# Patient Record
Sex: MALE | NOT HISPANIC OR LATINO | Employment: FULL TIME | URBAN - METROPOLITAN AREA
[De-identification: names, ages, dates, MRNs, and addresses within clinical notes are randomized per-mention and may not be internally consistent; named-entity substitution may affect disease eponyms.]

---

## 2021-05-27 ENCOUNTER — OFFICE VISIT (OUTPATIENT)
Dept: GASTROENTEROLOGY | Facility: CLINIC | Age: 51
End: 2021-05-27
Payer: COMMERCIAL

## 2021-05-27 VITALS
SYSTOLIC BLOOD PRESSURE: 124 MMHG | BODY MASS INDEX: 31.15 KG/M2 | DIASTOLIC BLOOD PRESSURE: 83 MMHG | HEIGHT: 72 IN | HEART RATE: 74 BPM | WEIGHT: 230 LBS

## 2021-05-27 DIAGNOSIS — K20.0 EOSINOPHILIC ESOPHAGITIS: Primary | ICD-10-CM

## 2021-05-27 DIAGNOSIS — L40.50 PSORIATIC ARTHRITIS (HCC): ICD-10-CM

## 2021-05-27 DIAGNOSIS — Z12.11 ENCOUNTER FOR SCREENING COLONOSCOPY: ICD-10-CM

## 2021-05-27 DIAGNOSIS — R13.14 PHARYNGOESOPHAGEAL DYSPHAGIA: ICD-10-CM

## 2021-05-27 PROCEDURE — 99214 OFFICE O/P EST MOD 30 MIN: CPT | Performed by: INTERNAL MEDICINE

## 2021-05-27 RX ORDER — FAMOTIDINE 20 MG/1
TABLET, FILM COATED ORAL
COMMUNITY
End: 2021-07-13 | Stop reason: HOSPADM

## 2021-05-27 RX ORDER — DEXAMETHASONE 4 MG/1
TABLET ORAL
COMMUNITY
Start: 2021-05-13 | End: 2021-09-21 | Stop reason: SDUPTHER

## 2021-05-27 RX ORDER — ADALIMUMAB 40MG/0.4ML
KIT SUBCUTANEOUS
COMMUNITY
Start: 2021-05-20

## 2021-05-27 RX ORDER — MOMETASONE FUROATE 50 UG/1
SPRAY, METERED NASAL
COMMUNITY
Start: 2021-05-13 | End: 2021-07-12 | Stop reason: ALTCHOICE

## 2021-05-27 NOTE — PROGRESS NOTES
[facility] - Outpatient Follow-up Note  Tae Grimes 46 y o  male MRN: [id number]  Encounter: [id number]          ASSESSMENT AND PLAN:      1  Eosinophilic esophagitis   patient has history of eosinophilic esophagitis  Patient was referred for allergy testings  However no significant of definitive allergy was found  Patient tells me that he was positive for dust mite  Patient was put on Flovent inhaler which he is still using  He denies any nausea or vomiting  He denies any significant heartburn or indigestion  He continues to have difficulty in swallowing with occasional food getting stuck  This usually happens with bread and meat  He denies any weight loss or weight gain  He denies any other systemic symptoms at this time  He has done well overall     - EGD; Future  - PAT Covid Screening; Future    2  Pharyngoesophageal dysphagia    Patient is having some difficulty in swallowing secondary to eosinophilic esophagitis  Upper endoscopy and dilatation will be performed for further evaluation and recommendations     - EGD; Future  - PAT Covid Screening; Future    3  Psoriatic arthritis ([address] )    Patient does have history of psoriatic arthritis  He takes you   Humira injections per that  His cirrhotic rash and arthritis his well under control at this time  - PAT Covid Screening; Future    4  Encounter for screening colonoscopy  Patient has turned 48  A screening colonoscopy will be done  There is no significant family history of colon cancer in the family  His grandfather probably had stomach cancer  However it is unclear  - Colonoscopy; Future  - PAT Covid Screening; Future    ______________________________________________________________________    SUBJECTIVE:    Intermittent dysphagia  No weight loss or weight gain  No change in bowel habits or rectal bleeding  REVIEW OF SYSTEMS IS OTHERWISE NEGATIVE        Historical Information   Past Medical History: Diagnosis Date    Arthritis      Past Surgical History:   Procedure Laterality Date    HERNIA REPAIR      UPPER GASTROINTESTINAL ENDOSCOPY       Social History   Social History     Substance and Sexual Activity   Alcohol Use Not Currently     Social History     Substance and Sexual Activity   Drug Use Never     Social History     Tobacco Use   Smoking Status Never Smoker   Smokeless Tobacco Never Used     History reviewed  No pertinent family history  Meds/Allergies       Current Outpatient Medications:     famotidine (PEPCID) 20 mg tablet    Humira Pen 40 MG/0 4ML PNKT    mometasone (Nasonex) 50 mcg/act nasal spray    Flovent  MCG/ACT inhaler    No Known Allergies        Objective     Blood pressure 124/83, pulse 74, height 6' (1 829 m), weight 104 kg (230 lb)  Body mass index is 31 19 kg/m²  PHYSICAL EXAM:      General Appearance:   Alert, cooperative, no distress   HEENT:   Normocephalic, atraumatic, anicteric      Neck:  Supple, symmetrical, trachea midline   Lungs:   Clear to auscultation bilaterally; no rales, rhonchi or wheezing; respirations unlabored    Heart[de-identified]   Regular rate and rhythm; no murmur, rub, or gallop  Abdomen:   Soft, non-tender, non-distended; normal bowel sounds; no masses, no organomegaly    Genitalia:   Deferred    Rectal:   Deferred    Extremities:  No cyanosis, clubbing or edema    Pulses:  2+ and symmetric    Skin:  No jaundice, rashes, or lesions    Lymph nodes:  No palpable cervical lymphadenopathy        Lab Results:   No visits with results within 1 Day(s) from this visit  Latest known visit with results is:   No results found for any previous visit  Radiology Results:   No results found

## 2021-06-08 ENCOUNTER — TELEPHONE (OUTPATIENT)
Dept: GASTROENTEROLOGY | Facility: CLINIC | Age: 51
End: 2021-06-08

## 2021-06-08 NOTE — TELEPHONE ENCOUNTER
Pt called lmom that he needs to reschedule his procedure  I called back lmom for him to call back to get rescheduled  If he calls back please get him rescheduled  I did not cancel appt

## 2021-07-12 RX ORDER — FLUTICASONE PROPIONATE 50 MCG
SPRAY, SUSPENSION (ML) NASAL
COMMUNITY
Start: 2021-06-18

## 2021-07-13 ENCOUNTER — ANESTHESIA EVENT (OUTPATIENT)
Dept: GASTROENTEROLOGY | Facility: AMBULATORY SURGERY CENTER | Age: 51
End: 2021-07-13

## 2021-07-13 ENCOUNTER — ANESTHESIA (OUTPATIENT)
Dept: GASTROENTEROLOGY | Facility: AMBULATORY SURGERY CENTER | Age: 51
End: 2021-07-13

## 2021-07-13 ENCOUNTER — HOSPITAL ENCOUNTER (OUTPATIENT)
Dept: GASTROENTEROLOGY | Facility: AMBULATORY SURGERY CENTER | Age: 51
Discharge: HOME | End: 2021-07-13
Payer: COMMERCIAL

## 2021-07-13 VITALS
WEIGHT: 230 LBS | SYSTOLIC BLOOD PRESSURE: 120 MMHG | BODY MASS INDEX: 31.15 KG/M2 | OXYGEN SATURATION: 98 % | TEMPERATURE: 94.8 F | HEIGHT: 72 IN | RESPIRATION RATE: 18 BRPM | DIASTOLIC BLOOD PRESSURE: 83 MMHG | HEART RATE: 60 BPM

## 2021-07-13 DIAGNOSIS — Z12.11 ENCOUNTER FOR SCREENING COLONOSCOPY: ICD-10-CM

## 2021-07-13 DIAGNOSIS — R13.14 PHARYNGOESOPHAGEAL DYSPHAGIA: ICD-10-CM

## 2021-07-13 DIAGNOSIS — K21.00 GASTROESOPHAGEAL REFLUX DISEASE WITH ESOPHAGITIS WITHOUT HEMORRHAGE: Primary | ICD-10-CM

## 2021-07-13 DIAGNOSIS — K20.0 EOSINOPHILIC ESOPHAGITIS: ICD-10-CM

## 2021-07-13 PROCEDURE — 00813 ANES UPR LWR GI NDSC PX: CPT | Performed by: NURSE ANESTHETIST, CERTIFIED REGISTERED

## 2021-07-13 PROCEDURE — 88305 TISSUE EXAM BY PATHOLOGIST: CPT | Performed by: PATHOLOGY

## 2021-07-13 PROCEDURE — 45385 COLONOSCOPY W/LESION REMOVAL: CPT | Performed by: INTERNAL MEDICINE

## 2021-07-13 PROCEDURE — 43249 ESOPH EGD DILATION <30 MM: CPT | Performed by: INTERNAL MEDICINE

## 2021-07-13 PROCEDURE — 43239 EGD BIOPSY SINGLE/MULTIPLE: CPT | Performed by: INTERNAL MEDICINE

## 2021-07-13 PROCEDURE — 45380 COLONOSCOPY AND BIOPSY: CPT | Performed by: INTERNAL MEDICINE

## 2021-07-13 RX ORDER — LIDOCAINE HYDROCHLORIDE 20 MG/ML
INJECTION, SOLUTION EPIDURAL; INFILTRATION; INTRACAUDAL; PERINEURAL AS NEEDED
Status: DISCONTINUED | OUTPATIENT
Start: 2021-07-13 | End: 2021-07-13

## 2021-07-13 RX ORDER — SODIUM CHLORIDE 9 MG/ML
INJECTION, SOLUTION INTRAVENOUS CONTINUOUS PRN
Status: DISCONTINUED | OUTPATIENT
Start: 2021-07-13 | End: 2021-07-13

## 2021-07-13 RX ORDER — PANTOPRAZOLE SODIUM 40 MG/1
40 TABLET, DELAYED RELEASE ORAL DAILY
Qty: 90 TABLET | Refills: 3 | Status: SHIPPED | OUTPATIENT
Start: 2021-07-13 | End: 2022-05-26

## 2021-07-13 RX ORDER — PROPOFOL 10 MG/ML
INJECTION, EMULSION INTRAVENOUS AS NEEDED
Status: DISCONTINUED | OUTPATIENT
Start: 2021-07-13 | End: 2021-07-13

## 2021-07-13 RX ADMIN — PROPOFOL 50 MG: 10 INJECTION, EMULSION INTRAVENOUS at 09:23

## 2021-07-13 RX ADMIN — PROPOFOL 50 MG: 10 INJECTION, EMULSION INTRAVENOUS at 09:42

## 2021-07-13 RX ADMIN — PROPOFOL 50 MG: 10 INJECTION, EMULSION INTRAVENOUS at 09:48

## 2021-07-13 RX ADMIN — PROPOFOL 50 MG: 10 INJECTION, EMULSION INTRAVENOUS at 09:25

## 2021-07-13 RX ADMIN — SODIUM CHLORIDE: 9 INJECTION, SOLUTION INTRAVENOUS at 09:16

## 2021-07-13 RX ADMIN — PROPOFOL 50 MG: 10 INJECTION, EMULSION INTRAVENOUS at 09:31

## 2021-07-13 RX ADMIN — LIDOCAINE HYDROCHLORIDE 50 MG: 20 INJECTION, SOLUTION EPIDURAL; INFILTRATION; INTRACAUDAL; PERINEURAL at 09:21

## 2021-07-13 RX ADMIN — PROPOFOL 50 MG: 10 INJECTION, EMULSION INTRAVENOUS at 09:28

## 2021-07-13 RX ADMIN — PROPOFOL 50 MG: 10 INJECTION, EMULSION INTRAVENOUS at 09:24

## 2021-07-13 RX ADMIN — PROPOFOL 50 MG: 10 INJECTION, EMULSION INTRAVENOUS at 09:35

## 2021-07-13 RX ADMIN — PROPOFOL 150 MG: 10 INJECTION, EMULSION INTRAVENOUS at 09:21

## 2021-07-13 RX ADMIN — PROPOFOL 50 MG: 10 INJECTION, EMULSION INTRAVENOUS at 09:39

## 2021-07-13 RX ADMIN — LIDOCAINE HYDROCHLORIDE 50 MG: 20 INJECTION, SOLUTION EPIDURAL; INFILTRATION; INTRACAUDAL; PERINEURAL at 09:22

## 2021-07-13 RX ADMIN — PROPOFOL 50 MG: 10 INJECTION, EMULSION INTRAVENOUS at 09:44

## 2021-07-13 NOTE — ANESTHESIA POSTPROCEDURE EVALUATION
Post-Op Assessment Note    CV Status:  Stable  Pain Score: 0    Pain management: adequate     Mental Status:  Alert and awake   Hydration Status:  Stable   PONV Controlled:  None   Airway Patency:  Patent      Post Op Vitals Reviewed: Yes      Staff: CRNA         No complications documented      BP   100/62   Temp     Pulse  60   Resp   18   SpO2   95

## 2021-07-13 NOTE — H&P
History and Physical -  [facility]  Kavitha Monae 46 y o  male MRN: [id number]                  HPI: Kavitha Monae is a 46y o  year old male who presents for upper endoscopy and colonoscopy  Patient has history of eosinophilic esophagitis  Screening colonoscopy is being done  REVIEW OF SYSTEMS: Per the HPI, and otherwise unremarkable  Historical Information   Past Medical History:   Diagnosis Date    Arthritis     psoriatic    Dysphagia     hx of dilatation    Eosinophilic esophagitis     GERD (gastroesophageal reflux disease)      Past Surgical History:   Procedure Laterality Date    HERNIA REPAIR      right inguinal    UPPER GASTROINTESTINAL ENDOSCOPY       Social History   Social History     Substance and Sexual Activity   Alcohol Use Not Currently     Social History     Substance and Sexual Activity   Drug Use Never     Social History     Tobacco Use   Smoking Status Never Smoker   Smokeless Tobacco Never Used     History reviewed  No pertinent family history  Meds/Allergies       Current Outpatient Medications:     famotidine (PEPCID) 20 mg tablet    Flovent  MCG/ACT inhaler    fluticasone (FLONASE) 50 mcg/act nasal spray    Humira Pen 40 MG/0 4ML PNKT    Omega-3 Fatty Acids (OMEGA-3 FISH OIL PO)    VITAMIN D, CHOLECALCIFEROL, PO    No Known Allergies    Objective     /82   Pulse 66   Temp (!) 94 8 °F (34 9 °C) (Skin)   Resp 18   Ht 6' (1 829 m)   Wt 104 kg (230 lb)   SpO2 99%   BMI 31 19 kg/m²       PHYSICAL EXAM    Gen: NAD  Head: NCAT  CV: RRR  CHEST: Clear  ABD: soft, NT/ND  EXT: no edema      ASSESSMENT/PLAN:  This is a 46y o  year old male here for EGD and colonoscopy, and he is stable and optimized for his procedure

## 2021-07-13 NOTE — DISCHARGE INSTRUCTIONS
Pantoprazole (By mouth)   Pantoprazole (pan-TOE-pra-zole)  Treats gastroesophageal reflux disease (GERD), a damaged esophagus, and conditions that cause your stomach to make too much acid, including Zollinger-Frank syndrome  This medicine is a proton pump inhibitor (PPI)  Brand Name(s): Protonix   There may be other brand names for this medicine  When This Medicine Should Not Be Used: This medicine is not right for everyone  Do not use it if you had an allergic reaction to pantoprazole or similar medicines  How to Use This Medicine:   Packet, Tablet, Delayed Release Tablet, Long Acting Tablet  · Your doctor will tell you how much medicine to use  Do not use more than directed  · Delayed-release tablet: Swallow the tablet whole  Do not crush, break, or chew it  · Delayed-release packet: Take the medicine mixed in apple juice or applesauce at least 30 minutes before a meal  It may also be given using a nasogastric tube when mixed in apple juice only  ? To prepare with applesauce:   § Mix the packet contents with 1 teaspoon of applesauce  Do not mix with water or other liquids or food  Do not divide the packet contents to make smaller doses  § Swallow the mixture within 10 minutes after you mix it  Do not chew or crush the granules  § Sip some water after you take the mixture to make sure you swallow all of the medicine  ? To prepare with apple juice:   § Mix the packet contents with 1 teaspoon of apple juice in a small cup  Do not divide the packet contents to make smaller doses  § Stir for 5 seconds and drink the mixture immediately  Do not chew or crush the granules  § To make sure you get all of the medicine, add more apple juice to the cup  Drink it immediately  ? To prepare for a feeding tube:   § Pour the packet contents in a 2-ounce (60 milliliter [mL]) catheter-tip syringe  § Add 10 mL of apple juice to the syringe  Add the mixture to the tube   Gently tap or shake the barrel of the syringe to help empty it  § Add 10 mL of apple juice to the syringe and put it in the tube  Do this at least 2 times  There should be no granules left in the syringe  · This medicine should come with a Medication Guide  Ask your pharmacist for a copy if you do not have one  · Missed dose: Take a dose as soon as you remember  If it is almost time for your next dose, wait until then and take a regular dose  Do not take extra medicine to make up for a missed dose  · Store the medicine in a closed container at room temperature, away from heat, moisture, and direct light  Drugs and Foods to Avoid:   Ask your doctor or pharmacist before using any other medicine, including over-the-counter medicines, vitamins, and herbal products  · Do not use pantoprazole together with medicine that contains rilpivirine  · Some medicines can affect how pantoprazole works  Tell your doctor if you are using any of the following:   ? Ampicillin, atazanavir, dasatinib, digoxin, erlotinib, itraconazole, ketoconazole, methotrexate, mycophenolate mofetil, nelfinavir, nilotinib, saquinavir  ? Blood thinner (including warfarin)  ? Diuretic (water pill)  ? Iron supplements  Warnings While Using This Medicine:   · Tell your doctor if you are pregnant or breastfeeding, or if you have kidney disease, liver disease, lupus, or osteoporosis  · This medicine may cause the following problems:  ? Kidney problems, including acute tubulointerstitial nephritis  ? Increased risk of broken bones in the hip, wrist, or spine (more likely if used several times per day or longer than 1 year)  ? Lupus  ? Fundic gland polyps (abnormal growth in the upper part of your stomach)  · This medicine can cause diarrhea  Call your doctor if the diarrhea becomes severe, does not stop, or is bloody  Do not take any medicine to stop diarrhea until you have talked to your doctor  Diarrhea can occur 2 months or more after you stop taking this medicine    · Tell any doctor or dentist who treats you that you are using this medicine  This medicine may affect certain medical test results  · Your doctor will do lab tests at regular visits to check on the effects of this medicine  Keep all appointments  · Keep all medicine out of the reach of children  Never share your medicine with anyone  Possible Side Effects While Using This Medicine:   Call your doctor right away if you notice any of these side effects:  · Allergic reaction: Itching or hives, swelling in your face or hands, swelling or tingling in your mouth or throat, chest tightness, trouble breathing  · Blistering, peeling, red skin rash  · Fever, joint pain, swelling in your body, unusual weight gain, change in how much or how often you urinate, blood in the urine  · Joint pain, rash on your cheeks or arms that gets worse in the sun  · Seizures, dizziness, uneven heartbeat, muscle cramps or twitching  · Severe diarrhea, stomach cramp or pain, nausea, vomiting, loss of appetite, weight loss  · Unusual tiredness or weakness  If you notice these less serious side effects, talk with your doctor:   · Headache  If you notice other side effects that you think are caused by this medicine, tell your doctor  Call your doctor for medical advice about side effects  You may report side effects to FDA at 5-867-FDA-8101  © Copyright [employer] 2021 Information is for End User's use only and may not be sold, redistributed or otherwise used for commercial purposes  The above information is an  only  It is not intended as medical advice for individual conditions or treatments  Talk to your doctor, nurse or pharmacist before following any medical regimen to see if it is safe and effective for you  Upper Endoscopy and Colonoscopy   WHAT YOU NEED TO KNOW:   An upper endoscopy is also called an upper gastrointestinal (GI) endoscopy, or an esophagogastroduodenoscopy (EGD)   It is a procedure to examine the inside of your esophagus, stomach, and duodenum (first part of the small intestine) with a scope  You may feel bloated, gassy, or have some abdominal discomfort after your procedure  Your throat may be sore for 24 to 36 hours  You may burp or pass gas from air that is still inside your body  A colonoscopy is a procedure to examine the inside of your colon (intestine) with a scope  Polyps or tissue growths may have been removed during your colonoscopy  It is normal to feel bloated and to have some abdominal discomfort  You should be passing gas  If you have hemorrhoids or you had polyps removed, you may have a small amount of bleeding  DISCHARGE INSTRUCTIONS:   Seek care immediately if:   · You have sudden, severe abdominal pain  · You have problems swallowing  · You have a large amount of black, sticky bowel movements or blood in your bowel movements  · You have sudden trouble breathing  · You feel weak, lightheaded, or faint or your heart beats faster than normal for you  Contact your healthcare provider if:   · You have a fever and chills  · You have nausea or are vomiting  · Your abdomen is bloated or feels full and hard  · You have abdominal pain  · You have a large amount of black, sticky bowel movements or blood in your bowel movements  · You have not had a bowel movement for 3 days after your procedure  · You have rash or hives  · You have questions or concerns about your procedure  Activity:   ·       Do not lift, strain, or run for 24 hours after your procedure  ·       Rest after your procedure  You have been given medicine to relax you  Do not drive or make important decisions until the day after your procedure  Return to your normal activity as directed  ·       Relieve gas and discomfort from bloating by lying on your right side with a heating pad on your abdomen  You may need to take short walks to help the gas move out   Eat small meals until bloating is relieved  Follow up with your healthcare provider as directed: Write down your questions so you remember to ask them during your visits  If you take a blood thinner, please review the specific instructions from your endoscopist about when you should resume it  These can be found in the Recommendation and Your Medication list sections of this After Visit Summary  Pantoprazole (By mouth)   Pantoprazole (pan-TOE-pra-zole)  Treats gastroesophageal reflux disease (GERD), a damaged esophagus, and conditions that cause your stomach to make too much acid, including Zollinger-Frank syndrome  This medicine is a proton pump inhibitor (PPI)  Brand Name(s): Protonix   There may be other brand names for this medicine  When This Medicine Should Not Be Used: This medicine is not right for everyone  Do not use it if you had an allergic reaction to pantoprazole or similar medicines  How to Use This Medicine:   Packet, Tablet, Delayed Release Tablet, Long Acting Tablet  · Your doctor will tell you how much medicine to use  Do not use more than directed  · Delayed-release tablet: Swallow the tablet whole  Do not crush, break, or chew it  · Delayed-release packet: Take the medicine mixed in apple juice or applesauce at least 30 minutes before a meal  It may also be given using a nasogastric tube when mixed in apple juice only  ? To prepare with applesauce:   § Mix the packet contents with 1 teaspoon of applesauce  Do not mix with water or other liquids or food  Do not divide the packet contents to make smaller doses  § Swallow the mixture within 10 minutes after you mix it  Do not chew or crush the granules  § Sip some water after you take the mixture to make sure you swallow all of the medicine  ? To prepare with apple juice:   § Mix the packet contents with 1 teaspoon of apple juice in a small cup  Do not divide the packet contents to make smaller doses    § Stir for 5 seconds and drink the mixture immediately  Do not chew or crush the granules  § To make sure you get all of the medicine, add more apple juice to the cup  Drink it immediately  ? To prepare for a feeding tube:   § Pour the packet contents in a 2-ounce (60 milliliter [mL]) catheter-tip syringe  § Add 10 mL of apple juice to the syringe  Add the mixture to the tube  Gently tap or shake the barrel of the syringe to help empty it  § Add 10 mL of apple juice to the syringe and put it in the tube  Do this at least 2 times  There should be no granules left in the syringe  · This medicine should come with a Medication Guide  Ask your pharmacist for a copy if you do not have one  · Missed dose: Take a dose as soon as you remember  If it is almost time for your next dose, wait until then and take a regular dose  Do not take extra medicine to make up for a missed dose  · Store the medicine in a closed container at room temperature, away from heat, moisture, and direct light  Drugs and Foods to Avoid:   Ask your doctor or pharmacist before using any other medicine, including over-the-counter medicines, vitamins, and herbal products  · Do not use pantoprazole together with medicine that contains rilpivirine  · Some medicines can affect how pantoprazole works  Tell your doctor if you are using any of the following:   ? Ampicillin, atazanavir, dasatinib, digoxin, erlotinib, itraconazole, ketoconazole, methotrexate, mycophenolate mofetil, nelfinavir, nilotinib, saquinavir  ? Blood thinner (including warfarin)  ? Diuretic (water pill)  ? Iron supplements  Warnings While Using This Medicine:   · Tell your doctor if you are pregnant or breastfeeding, or if you have kidney disease, liver disease, lupus, or osteoporosis  · This medicine may cause the following problems:  ? Kidney problems, including acute tubulointerstitial nephritis  ?  Increased risk of broken bones in the hip, wrist, or spine (more likely if used several times per day or longer than 1 year)  ? Lupus  ? Fundic gland polyps (abnormal growth in the upper part of your stomach)  · This medicine can cause diarrhea  Call your doctor if the diarrhea becomes severe, does not stop, or is bloody  Do not take any medicine to stop diarrhea until you have talked to your doctor  Diarrhea can occur 2 months or more after you stop taking this medicine  · Tell any doctor or dentist who treats you that you are using this medicine  This medicine may affect certain medical test results  · Your doctor will do lab tests at regular visits to check on the effects of this medicine  Keep all appointments  · Keep all medicine out of the reach of children  Never share your medicine with anyone  Possible Side Effects While Using This Medicine:   Call your doctor right away if you notice any of these side effects:  · Allergic reaction: Itching or hives, swelling in your face or hands, swelling or tingling in your mouth or throat, chest tightness, trouble breathing  · Blistering, peeling, red skin rash  · Fever, joint pain, swelling in your body, unusual weight gain, change in how much or how often you urinate, blood in the urine  · Joint pain, rash on your cheeks or arms that gets worse in the sun  · Seizures, dizziness, uneven heartbeat, muscle cramps or twitching  · Severe diarrhea, stomach cramp or pain, nausea, vomiting, loss of appetite, weight loss  · Unusual tiredness or weakness  If you notice these less serious side effects, talk with your doctor:   · Headache  If you notice other side effects that you think are caused by this medicine, tell your doctor  Call your doctor for medical advice about side effects  You may report side effects to FDA at 3-234-FDA-1728  © Copyright [employer] 2021 Information is for End User's use only and may not be sold, redistributed or otherwise used for commercial purposes  The above information is an  only   It is not intended as medical advice for individual conditions or treatments  Talk to your doctor, nurse or pharmacist before following any medical regimen to see if it is safe and effective for you  High Fiber Diet   WHAT YOU NEED TO KNOW:   What is a high-fiber diet? A high-fiber diet includes foods that have a high amount of fiber  Fiber is the part of fruits, vegetables, and grains that is not broken down by your body  Fiber keeps your bowel movements regular  Fiber can also help lower your cholesterol level, control blood sugar in people with diabetes, and relieve constipation  Fiber can also help you control your weight because it helps you feel full faster  Most adults should eat 25 to 35 grams of fiber each day  Talk to your dietitian or healthcare provider about the amount of fiber you need  What foods are good sources of fiber? · Foods with at least 4 grams of fiber per serving:      ? ? to ½ cup of high-fiber cereal (check the nutrition label on the box)    ? ½ cup of blackberries or raspberries    ? 4 dried prunes    ? 1 cooked artichoke    ? ½ cup of cooked legumes, such as lentils, or red, kidney, and morrow beans    · Foods with 1 to 3 grams of fiber per serving:      ? 1 slice of whole-wheat, pumpernickel, or rye bread    ? ½ cup of cooked brown rice    ? 4 whole-wheat crackers    ? 1 cup of oatmeal    ? ½ cup of cereal with 1 to 3 grams of fiber per serving (check the nutrition label on the box)    ? 1 small piece of fruit, such as an apple, banana, pear, kiwi, or orange    ? 3 dates    ? ½ cup of canned apricots, fruit cocktail, peaches, or pears    ? ½ cup of raw or cooked vegetables, such as carrots, cauliflower, cabbage, spinach, squash, or corn  What are some ways that I can increase fiber in my diet? · Choose brown or wild rice instead of white rice  · Use whole wheat flour in recipes instead of white or all-purpose flour  · Add beans and peas to casseroles or soups       · Choose fresh fruit and vegetables with peels or skins on instead of juices  What other guidelines should I follow? · Add fiber to your diet slowly  You may have abdominal discomfort, bloating, and gas if you add fiber to your diet too quickly  · Drink plenty of liquids as you add fiber to your diet  You may have nausea or develop constipation if you do not drink enough water  Ask how much liquid to drink each day and which liquids are best for you  CARE AGREEMENT:   You have the right to help plan your care  Discuss treatment options with your healthcare provider to decide what care you want to receive  You always have the right to refuse treatment  The above information is an  only  It is not intended as medical advice for individual conditions or treatments  Talk to your doctor, nurse or pharmacist before following any medical regimen to see if it is safe and effective for you  © Copyright [address] Information is for End User's use only and may not be sold, redistributed or otherwise used for commercial purposes  All illustrations and images included in CareNotes® are the copyrighted property of [employer] A M [employer] Inc  or [address] [place] [address] Shila   Colorectal Polyps   WHAT YOU NEED TO KNOW:   What are colorectal polyps? Colorectal polyps are small growths of tissue in the lining of the colon and rectum  Most polyps are hyperplastic polyps and are usually benign (noncancerous)  Certain types of polyps, called adenomatous polyps, may turn into cancer  What increases my risk of colorectal polyps? The exact cause of colorectal polyps is unknown  The following may increase your risk:  · Older age    · A diet of foods high in fat and low in fiber     · Family history of polyps    · Intestinal diseases, such as Crohn's disease or ulcerative colitis    · An unhealthy lifestyle, such as physical inactivity, smoking, or drinking alcohol    · Obesity    What are the signs and symptoms of colorectal polyps?    · Blood in your bowel movement or bleeding from the rectum    · Change in bowel movement habits, such as diarrhea and constipation    · Abdominal pain    How are colorectal polyps diagnosed? You should have fecal blood screening once a year for colorectal disease if you are over 48years old  You should be screened earlier if you have an intestinal disease or a family history of polyps or colorectal cancer  During this screening, a sample of your bowel movement is checked for blood, which may be an early sign of colorectal polyps or cancer  You may also need any of the following tests:  · Digital rectal exam:  Your healthcare provider will examine your anus and use a finger to check your rectum for polyps  · Barium enema: A barium enema is an x-ray of the colon  A tube is put into your anus, and a liquid called barium is put through the tube  Barium is used so that healthcare providers can see your colon better on the x-ray film  · Virtual colonoscopy: This is a CT scan that takes pictures of the inside of your colon and rectum  A small, flexible tube is put into your rectum and air or carbon dioxide (gas) is used to expand your colon  This lets healthcare providers clearly see your colon and any polyps on a monitor  · Colonoscopy or sigmoidoscopy: These procedures help your healthcare provider see the inside of your colon using a flexible tube with a small light and camera on the end  During a sigmoidoscopy, your healthcare provider will only look at rectum and lower colon  During a colonoscopy, healthcare providers will look at the full length of your colon  Healthcare providers may remove a small amount of tissue from the colon for a biopsy  How are colorectal polyps treated? A polypectomy is a minimally invasive procedure to remove your polyps  They may be removed during a colonoscopy or sigmoidoscopy  Your healthcare provider may need to remove the polyps with a laparoscope   Laparoscopy is done by inserting a small, flexible scope into incisions made on your abdomen  What are the risks of colorectal polyps? You may bleed during a colonoscopy procedure  Your bowel may be perforated (torn) when polyps are removed  This may lead to an open abdominal surgery  During surgery, you may bleed too much or get an infection  Adenomatous polyps that are not removed may turn into cancer and become more difficult to treat  Where can I find support and more information? · Yecenia Chino ([facility])  [address] , [place] [address]  Phone: [phone]  Web Address: Kiara Greenwood  [facility] gov    When should I contact my healthcare provider? · You have a fever  · You have chills, a cough, or feel weak and achy  · You have abdominal pain that does not go away or gets worse after you take medicine  · Your abdomen is swollen  · You are losing weight without trying  · You have questions or concerns about your condition or care  When should I seek immediate care or call 911? · You have sudden shortness of breath  · You have a fast heart rate, fast breathing, or are too dizzy to stand up  · You have severe abdominal pain  · You see blood in your bowel movement  CARE AGREEMENT:   You have the right to help plan your care  Learn about your health condition and how it may be treated  Discuss treatment options with your healthcare providers to decide what care you want to receive  You always have the right to refuse treatment  The above information is an  only  It is not intended as medical advice for individual conditions or treatments  Talk to your doctor, nurse or pharmacist before following any medical regimen to see if it is safe and effective for you  © Copyright [address] Information is for End User's use only and may not be sold, redistributed or otherwise used for commercial purposes   All illustrations and images included in [facility] are the copyrighted property of A [employer] A M [employer] Inc  or Chrystal Fuchs   Esophageal Dilation   WHAT YOU NEED TO KNOW:   Esophageal dilation is a procedure to widen a narrow part of your esophagus  Your healthcare provider will use a dilator (inflatable balloon or another tool that expands) to make the area wider  He may also do an endoscopy before or during your esophageal dilation  During an endoscopy, your healthcare provider will use a scope to see inside your esophagus  DISCHARGE INSTRUCTIONS:   Medicines:   · Medicines  may be given to decrease stomach acid that can irritate your esophagus  · Take your medicine as directed  Contact your healthcare provider if you think your medicine is not helping or if you have side effects  Tell him if you are allergic to any medicine  Keep a list of the medicines, vitamins, and herbs you take  Include the amounts, and when and why you take them  Bring the list or the pill bottles to follow-up visits  Carry your medicine list with you in case of an emergency  Follow up with your healthcare provider as directed:  Write down your questions so you remember to ask them during your visits  Nutrition:  You may eat foods you normally eat  Chew your food well  Eat soft foods if you still have problems swallowing  Soft foods include applesauce, bananas, cooked cereal, cottage cheese, eggs, pudding, and yogurt  Ask for more information on what types of food to eat  Contact your healthcare provider if:   · You have a fever  · You feel very full or bloated  · You have more problems swallowing food  · You have nausea or are vomiting  · You have questions or concerns about your condition or care  Seek care immediately or call 911 if:   · You vomit blood  · You are not able to swallow any food  · You have a fast heartbeat, chest pain, or sudden trouble breathing  · Your abdomen suddenly becomes tender and hard      © Copyright [employer] 2018 Information is for [employer] use only and may not be sold, redistributed or otherwise used for commercial purposes  All illustrations and images included in CareNotes® are the copyrighted property of A [employer] A M [employer] Inc  or Chrystal Perez  The above information is an  only  It is not intended as medical advice for individual conditions or treatments  Talk to your doctor, nurse or pharmacist before following any medical regimen to see if it is safe and effective for you  Gastroesophageal Reflux Disease   WHAT YOU NEED TO KNOW:   Gastroesophageal reflux disease (GERD) is reflux that occurs more than twice a week for a few weeks  Reflux means acid and food in the stomach back up into the esophagus  It usually causes heartburn and other symptoms  GERD can cause other health problems over time if it is not treated  DISCHARGE INSTRUCTIONS:   Call your local emergency number (911 in the [address] [place] [address],[address]) if:   · You have severe chest pain and sudden trouble breathing  Return to the emergency department if:   · You have trouble breathing after you vomit  · You have trouble swallowing, or pain with swallowing  · Your bowel movements are black, bloody, or tarry-looking  · Your vomit looks like coffee grounds or has blood in it  Call your doctor or gastroenterologist if:   · You feel full and cannot burp or vomit  · You vomit large amounts, or you vomit often  · You are losing weight without trying  · Your symptoms get worse or do not improve with treatment  · You have questions or concerns about your condition or care  Medicines:   · Medicines  are used to decrease stomach acid  Medicine may also be used to help your lower esophageal sphincter and stomach contract (tighten) more  · Take your medicine as directed  Contact your healthcare provider if you think your medicine is not helping or if you have side effects  Tell him of her if you are allergic to any medicine  Keep a list of the medicines, vitamins, and herbs you take  Include the amounts, and when and why you take them  Bring the list or the pill bottles to follow-up visits  Carry your medicine list with you in case of an emergency  Manage GERD:   · Do not have foods or drinks that may increase heartburn  These include chocolate, peppermint, fried or fatty foods, drinks that contain caffeine, or carbonated drinks (soda)  Other foods include spicy foods, onions, tomatoes, and tomato-based foods  Do not have foods or drinks that can irritate your esophagus, such as citrus fruits, juices, and alcohol  · Do not eat large meals  When you eat a lot of food at one time, your stomach needs more acid to digest it  Eat 6 small meals each day instead of 3 large ones, and eat slowly  Do not eat meals 2 to 3 hours before bedtime  · Elevate the head of your bed  Place 6-inch blocks under the head of your bed frame  You may also use more than one pillow under your head and shoulders while you sleep  · Maintain a healthy weight  If you are overweight, weight loss may help relieve symptoms of GERD  · Do not smoke  Smoking weakens the lower esophageal sphincter and increases the risk of GERD  Ask your healthcare provider for information if you currently smoke and need help to quit  E-cigarettes or smokeless tobacco still contain nicotine  Talk to your healthcare provider before you use these products  · Do not wear clothing that is tight around your waist   Tight clothing can put pressure on your stomach and cause or worsen GERD symptoms  Follow up with your doctor or gastroenterologist as directed:  Write down your questions so you remember to ask them during your visits  © Copyright [address] Information is for End User's use only and may not be sold, redistributed or otherwise used for commercial purposes   All illustrations and images included in CareNotes® are the copyrighted property of [employer]  or [address]   The above information is an  only  It is not intended as medical advice for individual conditions or treatments  Talk to your doctor, nurse or pharmacist before following any medical regimen to see if it is safe and effective for you  Dysphagia   WHAT YOU NEED TO KNOW:   Dysphagia is trouble swallowing  It occurs when you have trouble moving food or liquid from your mouth to your esophagus or down to your stomach  It may occur when you eat, drink, or any time you try to swallow  DISCHARGE INSTRUCTIONS:   Return to the emergency department if:   · You choke on your own saliva  · You have chest pain  · You have shortness of breath  · You cannot eat or drink liquids at all  Contact your healthcare provider if:   · You lose weight without trying  · Your signs and symptoms get worse, or you have new signs or symptoms  · You have signs or symptoms of dehydration, such as increased thirst, dark yellow urine, or little or no urine  · You get colds often  · You have questions or concerns about your condition or care  Nutrition:  You may need to change the texture of the foods you eat to help reduce choking problems  Your healthcare provider may show you how to thicken liquids or soften foods to make them easier to swallow  Follow up with your healthcare provider as directed:  Write down your questions so you remember to ask them during your visits  © Copyright [address] Information is for End User's use only and may not be sold, redistributed or otherwise used for commercial purposes  All illustrations and images included in CareNotes® are the copyrighted property of [employer]  or [address]   The above information is an  only  It is not intended as medical advice for individual conditions or treatments   Talk to your doctor, nurse or pharmacist before following any medical regimen to see if it is safe and effective for you

## 2021-07-13 NOTE — DISCHARGE SUMMARY
Discharge Summary - Lynette Cat 46 y o  male MRN: [id number]    Unit/Bed#:  Encounter: [id number]    Admission Date:     Admitting Diagnosis: Encounter for screening colonoscopy [H83 66]  Eosinophilic esophagitis [U56 4]  Pharyngoesophageal dysphagia [R13 14]    HPI:  History of dysphagia and eosinophilic esophagitis  Screening colonoscopy  Procedures Performed: No orders of the defined types were placed in this encounter  Summary of Hospital Course:  Upper endoscopy and colonoscopy done  Tolerated procedure well  Significant Findings, Care, Treatment and Services Provided:  See procedure note    Complications:  None    Discharge Diagnosis:  Eosinophilic esophagitis with EG junction stricture  Colon polyp  Medical Problems     Resolved Problems  Date Reviewed: 7/13/2021    None                Condition at Discharge: good         Discharge instructions/Information to patient and family:   See after visit summary for information provided to patient and family  Provisions for Follow-Up Care:  See after visit summary for information related to follow-up care and any pertinent home health orders        PCP: Pablo Schafer DO    Disposition: Home

## 2021-07-13 NOTE — ANESTHESIA PREPROCEDURE EVALUATION
Procedure:  COLONOSCOPY  EGD    Relevant Problems   ANESTHESIA (within normal limits)      CARDIO (within normal limits)      ENDO (within normal limits)      GI/HEPATIC   (+) Dysphagia   (+) GERD (gastroesophageal reflux disease)      /RENAL (within normal limits)      GYN (within normal limits)      HEMATOLOGY (within normal limits)      MUSCULOSKELETAL   (+) Arthritis      NEURO/PSYCH (within normal limits)      PULMONARY (within normal limits)      Other   (+) Eosinophilic esophagitis        Physical Exam    Airway    Mallampati score: II  TM Distance: >3 FB  Neck ROM: full     Dental   No notable dental hx     Cardiovascular  Cardiovascular exam normal    Pulmonary  Pulmonary exam normal     Other Findings        Anesthesia Plan  ASA Score- 2     Anesthesia Type- IV sedation with anesthesia with ASA Monitors  Additional Monitors:   Airway Plan:           Plan Factors-Exercise tolerance (METS): >4 METS  Chart reviewed  Patient is not a current smoker  Induction- intravenous  Postoperative Plan-     Informed Consent- Anesthetic plan and risks discussed with patient

## 2021-07-13 NOTE — INTERVAL H&P NOTE
H&P reviewed  After examining the patient I find no changes in the patients condition since the H&P had been written      Vitals:    07/13/21 0848   BP: 140/82   Pulse: 66   Resp: 18   Temp: (!) 94 8 °F (34 9 °C)   SpO2: 99%

## 2021-07-19 ENCOUNTER — TELEPHONE (OUTPATIENT)
Dept: GASTROENTEROLOGY | Facility: CLINIC | Age: 51
End: 2021-07-19

## 2021-07-19 NOTE — TELEPHONE ENCOUNTER
LMOM for pt to call back to schedule a f/u appt  I will try calling again in 1 week  If he calls back please get him scheduled  Thank you

## 2021-07-19 NOTE — TELEPHONE ENCOUNTER
----- Message from Chana Meadows LPN sent at 6/49/2156  1:08 PM EDT -----  Patient aware via hipaa  Educated  Colon 3 years  Please call patient to schedule f/u to flip   Thank you

## 2021-07-27 NOTE — TELEPHONE ENCOUNTER
LMOM for pt to call back to schedule f/u appt  I will try calling again in 2 weeks  If he calls please get him scheduled  Thank you

## 2021-08-03 NOTE — TELEPHONE ENCOUNTER
Lmom for pt to call back to schedule his f/u appt with Dr Kareem Weir  If he calls please get him scheduled   Thank you

## 2021-08-05 ENCOUNTER — TELEPHONE (OUTPATIENT)
Dept: GASTROENTEROLOGY | Facility: CLINIC | Age: 51
End: 2021-08-05

## 2021-08-05 ENCOUNTER — OFFICE VISIT (OUTPATIENT)
Dept: GASTROENTEROLOGY | Facility: CLINIC | Age: 51
End: 2021-08-05
Payer: COMMERCIAL

## 2021-08-05 VITALS
BODY MASS INDEX: 31.15 KG/M2 | HEART RATE: 74 BPM | WEIGHT: 230 LBS | HEIGHT: 72 IN | SYSTOLIC BLOOD PRESSURE: 141 MMHG | DIASTOLIC BLOOD PRESSURE: 92 MMHG

## 2021-08-05 DIAGNOSIS — R13.19 ESOPHAGEAL DYSPHAGIA: ICD-10-CM

## 2021-08-05 DIAGNOSIS — Z91.018 FOOD ALLERGY: ICD-10-CM

## 2021-08-05 DIAGNOSIS — K20.0 EOSINOPHILIC ESOPHAGITIS: Primary | ICD-10-CM

## 2021-08-05 PROCEDURE — 99213 OFFICE O/P EST LOW 20 MIN: CPT | Performed by: INTERNAL MEDICINE

## 2021-08-05 NOTE — TELEPHONE ENCOUNTER
Please call patient to schedule egd with dilatation in 6-8 weeks with Dr Jac Almodovar  instructions given    Thank you

## 2021-08-05 NOTE — PROGRESS NOTES
[facility] - Outpatient Follow-up Note  Penny Cifuentes 46 y o  male MRN: [id number]  Encounter: [id number]          ASSESSMENT AND PLAN:      1  Eosinophilic esophagitis  Patient has history of eosinophilic esophagitis and lower esophageal stricture  A tight stricture was dilated recently  He is eating better  Patient has been taking Flovent in the past   I have reinstructed him regarding Flovent use  Patient had seen allergist in the past however    2  Esophageal dysphagia  Patient has difficulty swallowing  After dilatation he is eating much better  Occasional hesitation in food going down  Patient had esophageal dilatation done to 14 5 mm  Further dilatation will be tried anus few weeks  Currently he is busy with family matters and college visits for his daughter  3  Food allergy  Patient was told to be allergic to dust mite however he was told not to drink milk  It is unclear if his allergy testing was adequately done  Patient will be referred back to allergist     ______________________________________________________________________    SUBJECTIVE:  Doing better  Swallowing better  There is no pain or discomfort  REVIEW OF SYSTEMS IS OTHERWISE NEGATIVE  Historical Information   Past Medical History:   Diagnosis Date    Arthritis     psoriatic    Dysphagia     hx of dilatation    Eosinophilic esophagitis     GERD (gastroesophageal reflux disease)      Past Surgical History:   Procedure Laterality Date    HERNIA REPAIR      right inguinal    UPPER GASTROINTESTINAL ENDOSCOPY       Social History   Social History     Substance and Sexual Activity   Alcohol Use Not Currently     Social History     Substance and Sexual Activity   Drug Use Never     Social History     Tobacco Use   Smoking Status Never Smoker   Smokeless Tobacco Never Used     History reviewed  No pertinent family history      Meds/Allergies       Current Outpatient Medications:     Flovent  MCG/ACT inhaler    fluticasone (FLONASE) 50 mcg/act nasal spray    Humira Pen 40 MG/0 4ML PNKT    Omega-3 Fatty Acids (OMEGA-3 FISH OIL PO)    pantoprazole (PROTONIX) 40 mg tablet    VITAMIN D, CHOLECALCIFEROL, PO    No Known Allergies        Objective     Blood pressure 141/92, pulse 74, height 6' (1 829 m), weight 104 kg (230 lb)  Body mass index is 31 19 kg/m²  PHYSICAL EXAM:      General Appearance:   Alert, cooperative, no distress   HEENT:   Normocephalic, atraumatic, anicteric      Neck:  Supple, symmetrical, trachea midline   Lungs:   Clear to auscultation bilaterally; no rales, rhonchi or wheezing; respirations unlabored    Heart[de-identified]   Regular rate and rhythm; no murmur, rub, or gallop  Abdomen:   Soft, non-tender, non-distended; normal bowel sounds; no masses, no organomegaly    Genitalia:   Deferred    Rectal:   Deferred    Extremities:  No cyanosis, clubbing or edema    Pulses:  2+ and symmetric    Skin:  No jaundice, rashes, or lesions    Lymph nodes:  No palpable cervical lymphadenopathy        Lab Results:   No visits with results within 1 Day(s) from this visit     Latest known visit with results is:   Hospital Outpatient Visit on 07/13/2021   Component Date Value    Case Report 07/13/2021                      Value:Surgical Pathology Report                         Case: [id number]                                   Authorizing Provider:  Carolee Gutierrez MD      Collected:           07/13/2021 0935              Ordering Location:     [address] [place] [address] Received:            07/13/2021 2042                                     [place]                                                                  Pathologist:           Jack Sandoval MD                                                                 Specimens:   A) - Esophagus, cold bx lower esophagus eosinophilic esophagitis                                    B) - Large Intestine, Right/Ascending Colon, cold bx prox ascending polyp                           C) - Large Intestine, Hepatic Flexure, cold snare hepatic flexure polyp                    Final Diagnosis 07/13/2021                      Value: This result contains rich text formatting which cannot be displayed here   Note 07/13/2021                      Value: This result contains rich text formatting which cannot be displayed here   Additional Information 07/13/2021                      Value: This result contains rich text formatting which cannot be displayed here   Synoptic Checklist 07/13/2021                      Value:  (COLON/RECTUM POLYP FORM - GI - C)                                                                                                                 : Adenoma(s)     Stevphen Rogers Description 07/13/2021                      Value: This result contains rich text formatting which cannot be displayed here  Radiology Results:   EGD    Result Date: 7/13/2021  Narrative: [facility] [address] [facility] [id number] [phone] DATE OF SERVICE: 7/13/21 PHYSICIAN(S): Xu Monge MD - Attending Physician INDICATION: Eosinophilic esophagitis, Pharyngoesophageal dysphagia POST-OP DIAGNOSIS: See the impression below  PREPROCEDURE: Informed consent was obtained for the procedure, including sedation  Risks of perforation, hemorrhage, adverse drug reaction and aspiration were discussed  The patient was placed in the left lateral decubitus position  Patient was explained about the risks and benefits of the procedure  Risks including but not limited to bleeding, infection, and perforation were explained in detail  Also explained about less than 100% sensitivity with the exam and other alternatives  DETAILS OF PROCEDURE: Patient was taken to the procedure room where a time out was performed to confirm correct patient and correct procedure   The patient underwent monitored anesthesia care, which was administered by an anesthesia professional  The patient's blood pressure, heart rate, level of consciousness, respirations and oxygen were monitored throughout the procedure  The scope was advanced to the second part of the duodenum  Retroflexion was performed in the fundus  The patient's estimated blood loss was minimal (<5 mL)  The procedure was not difficult  The patient tolerated the procedure well  There were no apparent complications  ANESTHESIA INFORMATION: ASA: II Anesthesia Type: IV Sedation with Anesthesia MEDICATIONS: No administrations occurring from 0917 to 0933 on 07/13/21 FINDINGS: Regular Z-line 40 cm from the incisors  Stricture noted at the EG junction  Balloon dilatation performed with a through the scope balloon as the scope was not able to go through this stricture  EG junction was dilated to 14 5 mm  Some bleeding noted  No further dilatation was done  Scarred mucosa with loss of vascular pattern in the lower third of the esophagus  Biopsies taken  History of eosinophilic esophagitis  SPECIMENS: * No specimens in log *     Impression: 1  Eosinophilic esophagitis noted especially involving lower esophagus  2  Stricture at the EG junction noted  Which did not allow the scope to go through on initial insertion  3  EG junction was dilated using a through the scope balloon up to 14 5 mm  After this the scope was able to go through  No further dilatation was done  RECOMMENDATION: Await pathology results Follow up with me in clinic Schedule repeat EGD for further dilatation as clinically indicated  Consider repeating allergy testing  Kenroy Healy MD     Colonoscopy    Addendum Date: 7/13/2021 Addendum:   [address] [place] [id number] [phone] DATE OF SERVICE: 7/13/21 PHYSICIAN(S): Kenroy Healy MD - Attending Physician INDICATION: Encounter for screening colonoscopy Colonoscopy performed for a screening indication  POST-OP DIAGNOSIS: See the impression below  HISTORY: Prior colonoscopy: No prior colonoscopy  BOWEL PREPARATION: Plenvu PREPROCEDURE: Informed consent was obtained for the procedure, including sedation  Risks including but not limited to bleeding, infection, perforation, adverse drug reaction and aspiration were explained in detail  Also explained about less than 100% sensitivity with the exam and other alternatives  The patient was placed in the left lateral decubitus position  DETAILS OF PROCEDURE: Patient was taken to the procedure room where a time out was performed to confirm correct patient and correct procedure  The patient underwent monitored anesthesia care, which was administered by an anesthesia professional  The patient's blood pressure, heart rate, level of consciousness, oxygen and respirations were monitored throughout the procedure  A digital rectal exam was performed  The scope was introduced through the anus and advanced to the cecum  Retroflexion was performed in the rectum  The quality of bowel preparation was evaluated using the [facility] Bowel Preparation Scale with scores of: right colon = 2, transverse colon = 2, left colon = 2  The total BBPS score was 6  Bowel prep was adequate  The patient experienced no blood loss  The procedure was not difficult  The patient tolerated the procedure well  There were no apparent complications   ANESTHESIA INFORMATION: ASA: II Anesthesia Type: IV Sedation with Anesthesia MEDICATIONS: No administrations occurring from 0917 to 0954 on 07/13/21 FINDINGS: One sessile, adenomatous-appearing polyp measuring smaller than 5 mm in the mid ascending colon; performed complete en bloc removal by cold forceps biopsy One sessile, adenomatous-appearing polyp measuring 5-9 mm in the hepatic flexure; completely removed en bloc by cold snare and retrieved specimen EVENTS: Procedure Events Event Event Time ENDO SCOPE OUT TIME 7/13/2021  9:33 AM ENDO CECUM REACHED 7/13/2021  9:43 AM SPECIMENS: ID Type Source Tests Collected by Time Destination 1 : cold bx lower esophagus eosinophilic esophagitis Tissue Esophagus TISSUE EXAM Florencia Marquez MD 7/13/2021  9:35 AM  2 : cold bx prox ascending polyp Tissue Large Intestine, Right/Ascending Colon TISSUE EXAM Florencia Marquez MD 7/13/2021  9:46 AM  3 : cold snare hepatic flexure polyp Tissue Large Intestine, Hepatic Flexure TISSUE EXAM Florencia Marquez MD 7/13/2021  9:50 AM  EQUIPMENT: [id number] IMPRESSION: 1  Colon polyp removed x2  2  Otherwise normal colonoscopy  RECOMMENDATION: Await pathology results Follow up with me in clinic Repeat colonoscopy in 3 years due to a personal history of colon polyps  Florencia Marquez MD     Result Date: 7/13/2021  Narrative: [facility] [address] [facility] [id number] [phone] DATE OF SERVICE: 7/13/21 PHYSICIAN(S): Florencia Marquez MD - Attending Physician INDICATION: Encounter for screening colonoscopy Colonoscopy performed for a screening indication  POST-OP DIAGNOSIS: See the impression below  HISTORY: Prior colonoscopy: No prior colonoscopy  BOWEL PREPARATION: Plenvu PREPROCEDURE: Informed consent was obtained for the procedure, including sedation  Risks including but not limited to bleeding, infection, perforation, adverse drug reaction and aspiration were explained in detail  Also explained about less than 100% sensitivity with the exam and other alternatives  The patient was placed in the left lateral decubitus position  DETAILS OF PROCEDURE: Patient was taken to the procedure room where a time out was performed to confirm correct patient and correct procedure  The patient underwent monitored anesthesia care, which was administered by an anesthesia professional  The patient's blood pressure, heart rate, level of consciousness, oxygen and respirations were monitored throughout the procedure  A digital rectal exam was performed   The scope was introduced through the anus and advanced to the cecum  Retroflexion was performed in the rectum  The quality of bowel preparation was evaluated using the [facility] Bowel Preparation Scale with scores of: right colon = 2, transverse colon = 2, left colon = 2  The total BBPS score was 6  Bowel prep was adequate  The patient experienced no blood loss  The procedure was not difficult  The patient tolerated the procedure well  There were no apparent complications  ANESTHESIA INFORMATION: ASA: II Anesthesia Type: IV Sedation with Anesthesia MEDICATIONS: No administrations occurring from 0917 to 0954 on 07/13/21 FINDINGS: One sessile, adenomatous-appearing polyp measuring smaller than 5 mm in the mid ascending colon; performed complete en bloc removal by cold forceps biopsy One sessile, adenomatous-appearing polyp measuring 5-9 mm in the hepatic flexure; completely removed en bloc by cold snare and retrieved specimen EVENTS: Procedure Events Event Event Time ENDO SCOPE OUT TIME 7/13/2021  9:33 AM ENDO CECUM REACHED 7/13/2021  9:43 AM SPECIMENS: ID Type Source Tests Collected by Time Destination 1 : cold bx lower esophagus eosinophilic esophagitis Tissue Esophagus TISSUE EXAM Heena Parker MD 7/13/2021  9:35 AM  2 : cold bx prox ascending polyp Tissue Large Intestine, Right/Ascending Colon TISSUE EXAM Heena Parker MD 7/13/2021  9:46 AM  3 : cold snare hepatic flexure polyp Tissue Large Intestine, Hepatic Flexure TISSUE EXAM Heena Parker MD 7/13/2021  9:50 AM  EQUIPMENT: [id number]     Impression: 1  Colon polyp removed x2  2  Otherwise normal colonoscopy  RECOMMENDATION: Await pathology results Follow up with me in clinic Repeat colonoscopy in 3 months due to a personal history of colon polyps   Heena Parker MD

## 2021-08-13 NOTE — TELEPHONE ENCOUNTER
I lmom for pt to please call back to schedule an appt with Dr Trent Hendricks  Will call pt again in two weeks if do not hear back from him

## 2021-09-21 DIAGNOSIS — K20.0 EOSINOPHILIC ESOPHAGITIS: Primary | ICD-10-CM

## 2021-09-21 RX ORDER — FLUTICASONE PROPIONATE 50 MCG
SPRAY, SUSPENSION (ML) NASAL
OUTPATIENT
Start: 2021-09-21

## 2021-09-21 RX ORDER — DEXAMETHASONE 4 MG/1
TABLET ORAL
Qty: 12 G | Refills: 0 | Status: SHIPPED | OUTPATIENT
Start: 2021-09-21 | End: 2021-12-14

## 2021-09-21 NOTE — TELEPHONE ENCOUNTER
Usually we prescribe Flovent for eosinophilic esophagitis, if he needs a refill of that medication we can give it  It doesn't look like we've ever prescribed him Flonase in the past and he should ask his PCP if that is the medication he is looking to refill

## 2021-12-14 DIAGNOSIS — K20.0 EOSINOPHILIC ESOPHAGITIS: ICD-10-CM

## 2021-12-14 RX ORDER — DEXAMETHASONE 4 MG/1
TABLET ORAL
Qty: 12 G | Refills: 1 | Status: SHIPPED | OUTPATIENT
Start: 2021-12-14 | End: 2022-05-26

## 2022-05-26 DIAGNOSIS — K21.00 GASTROESOPHAGEAL REFLUX DISEASE WITH ESOPHAGITIS WITHOUT HEMORRHAGE: ICD-10-CM

## 2022-05-26 DIAGNOSIS — K20.0 EOSINOPHILIC ESOPHAGITIS: ICD-10-CM

## 2022-05-26 RX ORDER — PANTOPRAZOLE SODIUM 40 MG/1
TABLET, DELAYED RELEASE ORAL
Qty: 90 TABLET | Refills: 3 | Status: SHIPPED | OUTPATIENT
Start: 2022-05-26

## 2022-05-26 RX ORDER — DEXAMETHASONE 4 MG/1
TABLET ORAL
Qty: 12 G | Refills: 1 | Status: SHIPPED | OUTPATIENT
Start: 2022-05-26 | End: 2022-08-10

## 2022-11-23 DIAGNOSIS — K20.0 EOSINOPHILIC ESOPHAGITIS: ICD-10-CM

## 2022-11-23 RX ORDER — DEXAMETHASONE 4 MG/1
TABLET ORAL
Qty: 12 G | Refills: 0 | Status: SHIPPED | OUTPATIENT
Start: 2022-11-23

## 2023-01-19 ENCOUNTER — TELEPHONE (OUTPATIENT)
Dept: GASTROENTEROLOGY | Facility: CLINIC | Age: 53
End: 2023-01-19

## 2023-01-19 ENCOUNTER — OFFICE VISIT (OUTPATIENT)
Dept: GASTROENTEROLOGY | Facility: CLINIC | Age: 53
End: 2023-01-19

## 2023-01-19 VITALS
BODY MASS INDEX: 33.18 KG/M2 | DIASTOLIC BLOOD PRESSURE: 85 MMHG | WEIGHT: 245 LBS | HEIGHT: 72 IN | SYSTOLIC BLOOD PRESSURE: 135 MMHG | HEART RATE: 78 BPM

## 2023-01-19 DIAGNOSIS — K21.00 GASTROESOPHAGEAL REFLUX DISEASE WITH ESOPHAGITIS WITHOUT HEMORRHAGE: ICD-10-CM

## 2023-01-19 DIAGNOSIS — Z86.010 HISTORY OF ADENOMATOUS POLYP OF COLON: ICD-10-CM

## 2023-01-19 DIAGNOSIS — K20.0 EOSINOPHILIC ESOPHAGITIS: Primary | ICD-10-CM

## 2023-01-19 RX ORDER — DEXAMETHASONE 4 MG/1
TABLET ORAL
Qty: 12 G | Refills: 1 | Status: SHIPPED | OUTPATIENT
Start: 2023-01-19

## 2023-01-19 RX ORDER — PANTOPRAZOLE SODIUM 40 MG/1
40 TABLET, DELAYED RELEASE ORAL
Qty: 90 TABLET | Refills: 3 | Status: SHIPPED | OUTPATIENT
Start: 2023-01-19

## 2023-01-19 NOTE — TELEPHONE ENCOUNTER
Patient was scheduled for an EGD on 1/30 at [facility] that I had to cancel due to his time no longer being available  Patient is able to be scheduled at [facility] with Dr Kulwant Marin for his EGD on 2/1 if the patient is open to having his procedure done there  I asked patient to please call back to confirm

## 2023-01-19 NOTE — PROGRESS NOTES
[facility] - Outpatient Follow-up Note  Renetta Oar 46 y o  male MRN: [id number]  Encounter: [id number]          ASSESSMENT AND PLAN:      1  Eosinophilic esophagitis  Patient with history of eosinophilic esophagitis especially involving the lower esophagus with stricture at the EG junction dilated on last EGD in July 2021 to 14 5 mm  Esophageal biopsy showed persistent eosinophilic esophagitis with eosinophils up to 20 per high-powered field  He saw an allergist previously and reports he was found to have an allergy to dust mites or bedbugs, but did not show any specific food allergies  He has noticed dairy is a trigger for him and avoids this for the most part, but will have ice cream once a week  If he has dairy for several days in a row, and his throat starts to feel like it closes up  Otherwise has been doing well on pantoprazole 40 mg daily/Flovent daily at night with no dysphagia/odynophagia, heartburn  He had been scheduled for repeat EGD in October 2021, but canceled  -Will reschedule EGD at this time to check for remission  -Continue avoidance of dairy  -Continue pantoprazole, refill given  -Continue Flovent, refill given  -     EGD; Future; Expected date: 01/19/2023  -     Flovent  MCG/ACT inhaler; SPRAY 1PUFF IN THROAT AT BEDTIME & SWALLOW (DON'T INHALE) DON'T EAT FOR 30MIN  RINSE MOUTH AFTER USE  -     pantoprazole (PROTONIX) 40 mg tablet; Take 1 tablet (40 mg total) by mouth daily before breakfast Take 30 minutes before    2  Gastroesophageal reflux disease with esophagitis without hemorrhage  Controlled on pantoprazole 40 mg daily  -     EGD; Future; Expected date: 01/19/2023  -     pantoprazole (PROTONIX) 40 mg tablet; Take 1 tablet (40 mg total) by mouth daily before breakfast Take 30 minutes before    3  History of adenomatous polyp of colon  Up-to-date on colon cancer screening    Last colonoscopy in July 2021 and 2 tubular adenoma polyps removed and recall was 3 years   -Next colonoscopy due in July 2024       ______________________________________________________________________    SUBJECTIVE:  Yuri Rocha is a 46 y o  male with history of eosinophilic esophagitis, GERD, colon polyps here for follow-up for med refills  He's currently taking Pantoprazole 40mg daily and Flovent daily at night  Denies any issues with heartburn, dysphagia/odynophagia, nausea/vomiting, abdominal pain  Denies any change in bowel habits, blood in stool, constipation/diarrhea  Denies family history of colon cancer  Reports he went to allergist Dr Alvin Vences and was found to be allergic to dust mites or bed bugs but wasn't allergic to any foods  He reports issues with dairy where his throat swells up-has eliminated this as much as he can but still cheats with ice cream about once a week  Dr Aries Segovia had wanted him to see Toribio [place] but he never did  Last EGD was in July 2021 showing eosinophilic esophagitis especially involving lower esophagus, stricture at EG junction noted which did not allow the scope to go through on initial insertion and was dilated up to 14 5 mm, after this the scope was able to go through  Esophageal biopsy showed persistent eosinophilic esophagitis and he was referred to an allergist   He had colonoscopy done at that time as well and 2 polyps were removed, otherwise colonic mucosa was normal   Biopsy consistent with tubular adenoma and he was recommended to repeat colonoscopy in 3 years          REVIEW OF SYSTEMS IS OTHERWISE NEGATIVE        Historical Information   Past Medical History:   Diagnosis Date   • Arthritis     psoriatic   • Colon polyp    • Dysphagia     hx of dilatation   • Eosinophilic esophagitis    • GERD (gastroesophageal reflux disease)      Past Surgical History:   Procedure Laterality Date   • COLONOSCOPY     • HERNIA REPAIR      right inguinal   • UPPER GASTROINTESTINAL ENDOSCOPY       Social History   Social History Substance and Sexual Activity   Alcohol Use Not Currently     Social History     Substance and Sexual Activity   Drug Use Never     Social History     Tobacco Use   Smoking Status Never   Smokeless Tobacco Never     History reviewed  No pertinent family history  Meds/Allergies       Current Outpatient Medications:   •  Flovent  MCG/ACT inhaler  •  fluticasone (FLONASE) 50 mcg/act nasal spray  •  Humira Pen 40 MG/0 4ML PNKT  •  pantoprazole (PROTONIX) 40 mg tablet  •  VITAMIN D, CHOLECALCIFEROL, PO  •  Omega-3 Fatty Acids (OMEGA-3 FISH OIL PO)    No Known Allergies        Objective     Blood pressure 135/85, pulse 78, height 6' (1 829 m), weight 111 kg (245 lb)  Body mass index is 33 23 kg/m²  PHYSICAL EXAM:      General Appearance:   Alert, cooperative, no distress   HEENT:   Normocephalic, atraumatic, anicteric  Neck:  Supple, symmetrical, trachea midline   Lungs:   Clear to auscultation bilaterally; no rales, rhonchi or wheezing; respirations unlabored    Heart[de-identified]   Regular rate and rhythm; no murmur  Abdomen:   Soft, non-tender, non-distended; normal bowel sounds; no masses, no organomegaly    Genitalia:   Deferred    Rectal:   Deferred    Extremities:  No cyanosis, clubbing or edema    Skin:  No jaundice, rashes, or lesions    Lymph nodes:  No palpable cervical lymphadenopathy        Lab Results:   No visits with results within 1 Day(s) from this visit     Latest known visit with results is:   Hospital Outpatient Visit on 07/13/2021   Component Date Value   • Case Report 07/13/2021                      Value:Surgical Pathology Report                         Case: [id number]                                   Authorizing Provider:  Evelin Wells MD      Collected:           07/13/2021 0935              Ordering Location:     [address] [place] [address] Received:            07/13/2021 2042                                     [facility] Pathologist:           Lakeshia Tam MD                                                                 Specimens:   A) - Esophagus, cold bx lower esophagus eosinophilic esophagitis                                    B) - Large Intestine, Right/Ascending Colon, cold bx prox ascending polyp                           C) - Large Intestine, Hepatic Flexure, cold snare hepatic flexure polyp                   • Final Diagnosis 07/13/2021                      Value: This result contains rich text formatting which cannot be displayed here  • Note 07/13/2021                      Value: This result contains rich text formatting which cannot be displayed here  • Additional Information 07/13/2021                      Value: This result contains rich text formatting which cannot be displayed here  • Synoptic Checklist 07/13/2021                      Value:  (COLON/RECTUM POLYP FORM - GI - C)                                                                                                                 : Adenoma(s)     • Gross Description 07/13/2021                      Value: This result contains rich text formatting which cannot be displayed here  Radiology Results:   No results found

## 2023-01-19 NOTE — PATIENT INSTRUCTIONS
Allergic Esophagitis   WHAT YOU NEED TO KNOW:   Allergic esophagitis is a condition that causes your esophagus to swell and narrow when your body reacts to allergens  An allergen is anything you are allergic to, such as certain foods, dust, or pollen  DISCHARGE INSTRUCTIONS:   Call your local emergency number (911 in the [address]) if:   Food is stuck in your throat  You have chest pain  Return to the emergency department if:   You vomit blood  Your bowel movements are black and sticky  You feel weak or dizzy  Call your doctor if:   You have a fever  You have white patches on your tongue and inside your mouth  You lose weight without trying  It is hard to swallow or it hurts to swallow, even after treatment  You have questions or concerns about your condition or care  Medicines: You may need any of the following:  Steroid medicine  may help decrease swelling in your esophagus  You will swallow this medicine so it coats your esophagus  Stomach acid medicine  helps keep heartburn symptoms under control  Take your medicine as directed  Contact your healthcare provider if you think your medicine is not helping or if you have side effects  Tell him or her if you are allergic to any medicine  Keep a list of the medicines, vitamins, and herbs you take  Include the amounts, and when and why you take them  Bring the list or the pill bottles to follow-up visits  Carry your medicine list with you in case of an emergency  Food changes: You may need to stop eating certain foods for a while to see if your symptoms improve  Start eating these foods again one at a time as directed  If certain foods cause your symptoms, do not eat them  Some common examples are dairy, nuts, eggs, and seafood  You may need to change what you eat to relieve your symptoms  You may need to see a dietitian to help you get the right amount of nutrients  Follow up with your doctor as directed:   Your doctor may refer you to a stomach specialist, allergist, or dietitian  Write down your questions so you remember to ask them during your follow-up visits  © Copyright [employer] 2022 Information is for End User's use only and may not be sold, redistributed or otherwise used for commercial purposes  All illustrations and images included in CareNotes® are the copyrighted property of A D A M , Inc  or Chrystal Fuchs   The above information is an  only  It is not intended as medical advice for individual conditions or treatments  Talk to your doctor, nurse or pharmacist before following any medical regimen to see if it is safe and effective for you

## 2023-01-19 NOTE — TELEPHONE ENCOUNTER
Disregard Mackenzie, I have to reschedule patient RN Assessment/[facility] nurse ? Gets PCA services thru [place]. New PCA.

## 2023-01-19 NOTE — TELEPHONE ENCOUNTER
Nanda Renae, this is an add on patient for an EGD at [facility] with Dr Sushila Harden on 1/30/23, thank you!     Scheduled date of EGD(as of today):1/30/23  Physician performing EGD:Ross  Location of EGD:[facility]  Instructions reviewed with patient by:Janel RICE  Clearances: None

## 2023-01-30 DIAGNOSIS — K20.0 EOSINOPHILIC ESOPHAGITIS: Primary | ICD-10-CM

## 2023-01-30 DIAGNOSIS — R13.19 ESOPHAGEAL DYSPHAGIA: ICD-10-CM

## 2023-04-03 ENCOUNTER — TELEPHONE (OUTPATIENT)
Dept: GASTROENTEROLOGY | Facility: CLINIC | Age: 53
End: 2023-04-03

## 2023-04-03 NOTE — TELEPHONE ENCOUNTER
Left message reminding patient of his EGD with Dr Ramakrishna Shaw on 4/20  He will need a , will be called day prior to procedure with arrival time and if he doesn't have his instructions or has any questions he can call  Did inform him of nothing to eat after midnight but may have clear liquids up to 4 hours prior

## 2023-04-05 DIAGNOSIS — K20.0 EOSINOPHILIC ESOPHAGITIS: ICD-10-CM

## 2023-04-05 RX ORDER — DEXAMETHASONE 4 MG/1
TABLET ORAL
Qty: 12 G | Refills: 1 | Status: SHIPPED | OUTPATIENT
Start: 2023-04-05

## 2023-05-23 ENCOUNTER — TELEPHONE (OUTPATIENT)
Dept: GASTROENTEROLOGY | Facility: CLINIC | Age: 53
End: 2023-05-23

## 2023-05-23 NOTE — TELEPHONE ENCOUNTER
Spoke with pt reminding him of his EGD on 6/8 with Dr Mica Hernandez  Pt has a  and will be called day prior with arrival time  He has his instructions from previously being scheduled

## 2023-05-30 DIAGNOSIS — K20.0 EOSINOPHILIC ESOPHAGITIS: ICD-10-CM

## 2023-05-30 RX ORDER — DEXAMETHASONE 4 MG/1
TABLET ORAL
Qty: 12 G | Refills: 1 | Status: SHIPPED | OUTPATIENT
Start: 2023-05-30

## 2023-06-07 RX ORDER — SODIUM CHLORIDE, SODIUM LACTATE, POTASSIUM CHLORIDE, CALCIUM CHLORIDE 600; 310; 30; 20 MG/100ML; MG/100ML; MG/100ML; MG/100ML
75 INJECTION, SOLUTION INTRAVENOUS CONTINUOUS
Status: CANCELLED | OUTPATIENT
Start: 2023-06-07

## 2023-06-08 ENCOUNTER — HOSPITAL ENCOUNTER (OUTPATIENT)
Dept: GASTROENTEROLOGY | Facility: AMBULATORY SURGERY CENTER | Age: 53
Discharge: HOME | End: 2023-06-08
Payer: COMMERCIAL

## 2023-06-08 ENCOUNTER — ANESTHESIA EVENT (OUTPATIENT)
Dept: GASTROENTEROLOGY | Facility: AMBULATORY SURGERY CENTER | Age: 53
End: 2023-06-08

## 2023-06-08 ENCOUNTER — ANESTHESIA (OUTPATIENT)
Dept: GASTROENTEROLOGY | Facility: AMBULATORY SURGERY CENTER | Age: 53
End: 2023-06-08

## 2023-06-08 VITALS
TEMPERATURE: 97.3 F | DIASTOLIC BLOOD PRESSURE: 69 MMHG | RESPIRATION RATE: 18 BRPM | BODY MASS INDEX: 31.83 KG/M2 | OXYGEN SATURATION: 97 % | HEIGHT: 72 IN | HEART RATE: 66 BPM | SYSTOLIC BLOOD PRESSURE: 120 MMHG | WEIGHT: 235 LBS

## 2023-06-08 DIAGNOSIS — K20.0 EOSINOPHILIC ESOPHAGITIS: ICD-10-CM

## 2023-06-08 DIAGNOSIS — K21.00 GASTROESOPHAGEAL REFLUX DISEASE WITH ESOPHAGITIS WITHOUT HEMORRHAGE: ICD-10-CM

## 2023-06-08 PROCEDURE — 43249 ESOPH EGD DILATION <30 MM: CPT | Performed by: INTERNAL MEDICINE

## 2023-06-08 PROCEDURE — 43239 EGD BIOPSY SINGLE/MULTIPLE: CPT | Performed by: INTERNAL MEDICINE

## 2023-06-08 PROCEDURE — 43251 EGD REMOVE LESION SNARE: CPT | Performed by: INTERNAL MEDICINE

## 2023-06-08 PROCEDURE — 88305 TISSUE EXAM BY PATHOLOGIST: CPT | Performed by: PATHOLOGY

## 2023-06-08 RX ORDER — SODIUM CHLORIDE, SODIUM LACTATE, POTASSIUM CHLORIDE, CALCIUM CHLORIDE 600; 310; 30; 20 MG/100ML; MG/100ML; MG/100ML; MG/100ML
75 INJECTION, SOLUTION INTRAVENOUS CONTINUOUS
Status: DISCONTINUED | OUTPATIENT
Start: 2023-06-08 | End: 2023-06-12 | Stop reason: HOSPADM

## 2023-06-08 RX ORDER — LIDOCAINE HYDROCHLORIDE 20 MG/ML
INJECTION, SOLUTION EPIDURAL; INFILTRATION; INTRACAUDAL; PERINEURAL AS NEEDED
Status: DISCONTINUED | OUTPATIENT
Start: 2023-06-08 | End: 2023-06-08

## 2023-06-08 RX ORDER — PROPOFOL 10 MG/ML
INJECTION, EMULSION INTRAVENOUS AS NEEDED
Status: DISCONTINUED | OUTPATIENT
Start: 2023-06-08 | End: 2023-06-08

## 2023-06-08 RX ADMIN — LIDOCAINE HYDROCHLORIDE 100 MG: 20 INJECTION, SOLUTION EPIDURAL; INFILTRATION; INTRACAUDAL; PERINEURAL at 12:42

## 2023-06-08 RX ADMIN — PROPOFOL 50 MG: 10 INJECTION, EMULSION INTRAVENOUS at 12:43

## 2023-06-08 RX ADMIN — PROPOFOL 20 MG: 10 INJECTION, EMULSION INTRAVENOUS at 12:50

## 2023-06-08 RX ADMIN — PROPOFOL 30 MG: 10 INJECTION, EMULSION INTRAVENOUS at 12:48

## 2023-06-08 RX ADMIN — SODIUM CHLORIDE, SODIUM LACTATE, POTASSIUM CHLORIDE, CALCIUM CHLORIDE: 600; 310; 30; 20 INJECTION, SOLUTION INTRAVENOUS at 11:57

## 2023-06-08 RX ADMIN — PROPOFOL 50 MG: 10 INJECTION, EMULSION INTRAVENOUS at 12:44

## 2023-06-08 RX ADMIN — PROPOFOL 150 MG: 10 INJECTION, EMULSION INTRAVENOUS at 12:42

## 2023-06-08 RX ADMIN — PROPOFOL 50 MG: 10 INJECTION, EMULSION INTRAVENOUS at 12:46

## 2023-06-08 NOTE — H&P
History and Physical - [facility]  Frankie Awan 48 y o  male MRN: [id number]                  HPI: Frankie Awan is a 48y o  year old male who presents for EGD  History of eosinophilic esophagitis or stricture  REVIEW OF SYSTEMS: Per the HPI, and otherwise unremarkable  Historical Information   Past Medical History:   Diagnosis Date   • Arthritis     psoriatic   • Colon polyp    • Dysphagia     hx of dilatation   • Eosinophilic esophagitis    • GERD (gastroesophageal reflux disease)      Past Surgical History:   Procedure Laterality Date   • COLONOSCOPY     • HERNIA REPAIR      right inguinal   • TONSILLECTOMY     • UPPER GASTROINTESTINAL ENDOSCOPY     • WISDOM TOOTH EXTRACTION       Social History   Social History     Substance and Sexual Activity   Alcohol Use Not Currently     Social History     Substance and Sexual Activity   Drug Use Never     Social History     Tobacco Use   Smoking Status Never   Smokeless Tobacco Never     History reviewed  No pertinent family history      Meds/Allergies       Current Outpatient Medications:   •  Flovent  MCG/ACT inhaler  •  fluticasone (FLONASE) 50 mcg/act nasal spray  •  Multiple Vitamin (MULTIVITAMIN ADULT PO)  •  Omega-3 Fatty Acids (OMEGA-3 FISH OIL PO)  •  pantoprazole (PROTONIX) 40 mg tablet  •  Risankizumab-rzaa (SKYRIZI, 150 MG DOSE, SC)  •  VITAMIN D, CHOLECALCIFEROL, PO    Current Facility-Administered Medications:   •  lactated ringers infusion, 75 mL/hr, Intravenous, Continuous, Continue from Pre-op at 06/08/23 1232    Facility-Administered Medications Ordered in Other Encounters:   •  lidocaine (PF) (XYLOCAINE-MPF) 2 % injection, , Intravenous, PRN, 100 mg at 06/08/23 1242  •  propofol (DIPRIVAN) 200 MG/20ML bolus injection, , Intravenous, PRN, 20 mg at 06/08/23 1250    No Known Allergies    Objective     /79   Pulse 72   Temp (!) 97 3 °F (36 3 °C) (Temporal)   Resp 18   Ht 6' (1 829 m)   Wt 107 kg (235 lb) SpO2 96%   BMI 31 87 kg/m²       PHYSICAL EXAM    Gen: NAD  Head: NCAT  CV: RRR  CHEST: Clear  ABD: soft, NT/ND  EXT: no edema      ASSESSMENT/PLAN:  This is a 48y o  year old male here for EGD, and he is stable and optimized for his procedure

## 2023-06-08 NOTE — ANESTHESIA POSTPROCEDURE EVALUATION
Post-Op Assessment Note    CV Status:  Stable  Pain Score: 0    Pain management: adequate     Mental Status:  Alert and awake   Hydration Status:  Euvolemic   PONV Controlled:  Controlled   Airway Patency:  Patent      Post Op Vitals Reviewed: Yes      Staff: Anesthesiologist, CRNA         There were no known notable events for this encounter      /67 (06/08/23 1300)    Temp     Pulse 69 (06/08/23 1300)   Resp 16 (06/08/23 1300)    SpO2 94 % (06/08/23 1300)

## 2023-06-08 NOTE — ANESTHESIA PREPROCEDURE EVALUATION
Procedure:  EGD    Relevant Problems   GI/HEPATIC   (+) Dysphagia   (+) GERD (gastroesophageal reflux disease)      MUSCULOSKELETAL   (+) Arthritis      Digestive   (+) Eosinophilic esophagitis        Physical Exam    Airway    Mallampati score: II  TM Distance: >3 FB  Neck ROM: full     Dental       Cardiovascular  Rhythm: regular, Rate: normal,     Pulmonary  Breath sounds clear to auscultation,     Other Findings        Anesthesia Plan  ASA Score- 2     Anesthesia Type- IV sedation with anesthesia with ASA Monitors  Additional Monitors:   Airway Plan:           Plan Factors-    Chart reviewed  Patient is not a current smoker  Induction- intravenous  Postoperative Plan-     Informed Consent- Anesthetic plan and risks discussed with patient  I personally reviewed this patient with the CRNA  Discussed and agreed on the Anesthesia Plan with the CRNA  Paticia Hammans

## 2023-06-08 NOTE — DISCHARGE SUMMARY
Discharge Summary - Kiet Falcon 48 y o  male MRN: [id number]    Unit/Bed#:  Encounter: [id number]    Admission Date: 6/8/2023    Admitting Diagnosis: Eosinophilic esophagitis [N80 1]  Gastroesophageal reflux disease with esophagitis without hemorrhage [K21 00]    HPI: History of eosinophilic esophagitis and progressive dysphagia    Procedures Performed: No orders of the defined types were placed in this encounter  Summary of Hospital Course: Tolerated procedure well    Significant Findings, Care, Treatment and Services Provided: Stricture noted at the EG junction which was dilated  Polyp removed    Complications: None    Discharge Diagnosis: See above    Medical Problems     Resolved Problems  Date Reviewed: 1/19/2023   None         Condition at Discharge: good         Discharge instructions/Information to patient and family:   See after visit summary for information provided to patient and family  Provisions for Follow-Up Care:  See after visit summary for information related to follow-up care and any pertinent home health orders        PCP: Lind Lesches, DO    Disposition: Home

## 2023-06-13 PROCEDURE — 88305 TISSUE EXAM BY PATHOLOGIST: CPT | Performed by: PATHOLOGY

## 2023-10-16 DIAGNOSIS — K20.0 EOSINOPHILIC ESOPHAGITIS: ICD-10-CM

## 2023-10-16 RX ORDER — DEXAMETHASONE 4 MG/1
TABLET ORAL
Qty: 12 G | Refills: 1 | Status: SHIPPED | OUTPATIENT
Start: 2023-10-16 | End: 2023-10-17 | Stop reason: SDUPTHER

## 2023-10-16 NOTE — TELEPHONE ENCOUNTER
Reason for call:   [x] Refill   [] Prior Auth  [] Other:     Office:   [] PCP/Provider -   [x] Speciality/Provider - Gi / [place]    Medication: Flovent HFA     Dose/Frequency: 110mcg    Quantity: 12gr    Pharmacy: [facility]    Does the patient have enough for 3 days?    [x] Yes   [] No - Send as HP to POD

## 2023-10-17 ENCOUNTER — TELEPHONE (OUTPATIENT)
Dept: GASTROENTEROLOGY | Facility: CLINIC | Age: 53
End: 2023-10-17

## 2023-10-17 DIAGNOSIS — K20.0 EOSINOPHILIC ESOPHAGITIS: ICD-10-CM

## 2023-10-17 RX ORDER — DEXAMETHASONE 4 MG/1
TABLET ORAL
Qty: 12 G | Refills: 1 | Status: SHIPPED | OUTPATIENT
Start: 2023-10-17

## 2023-10-18 ENCOUNTER — PREP FOR PROCEDURE (OUTPATIENT)
Dept: GASTROENTEROLOGY | Facility: CLINIC | Age: 53
End: 2023-10-18

## 2023-10-18 DIAGNOSIS — K21.00 GASTROESOPHAGEAL REFLUX DISEASE WITH ESOPHAGITIS, UNSPECIFIED WHETHER HEMORRHAGE: ICD-10-CM

## 2023-10-18 DIAGNOSIS — K20.0 EOSINOPHILIC ESOPHAGITIS: Primary | ICD-10-CM

## 2023-10-26 ENCOUNTER — TELEPHONE (OUTPATIENT)
Age: 53
End: 2023-10-26

## 2023-10-26 NOTE — TELEPHONE ENCOUNTER
OA Questions for EGD  Date: [ 10/26/2023 ]  Screened by: Carroll Boxer  ]     Referring Provider: [ rosangela Moreno  ]     Pre-Screening: BMI [  ]    Past EGD? If yes - Date: [ 6/8/2023  ]  Physician/Facility: [  [facility]  ]  Reason: [  Eosinophilic esophagitis   ]     SCHEDULING STAFF: If the patient is over 76years old, please schedule an office visit. ·      Does the patient want to see a gastroenterologist prior to their procedure to discuss any GI symptoms? yes  ·      Has the patient been hospitalized or had abdominal surgery in the past 6 months? ·      Does the patient use supplemental oxygen? no  ·      Does the patient take [Coumadin], [Lovenox], [Plavix], [Eliquis], [Xarelto], or other blood thinning medication? [no  ·      Has the patient had a stroke, cardiac event, or stent placed in the past year? no     SCHEDULING STAFF: If patient answers NO to the above questions, then schedule the procedure. If patient answers YES to any of the above questions, then schedule an office appointment. ·       If a repeat EGD is belated and patient declines procedure à notify provider. Scheduled date of EGD (as of today): pt will call back to schedule    Physician performing EGD:Ross    [facility] of EGD[facility]    Instructions reviewed with patient by:endy allen     Clearances:  none

## 2023-12-13 DIAGNOSIS — K20.0 EOSINOPHILIC ESOPHAGITIS: ICD-10-CM

## 2023-12-13 RX ORDER — DEXAMETHASONE 4 MG/1
TABLET ORAL
Qty: 12 G | Refills: 1 | Status: SHIPPED | OUTPATIENT
Start: 2023-12-13

## 2024-01-02 DIAGNOSIS — K20.0 EOSINOPHILIC ESOPHAGITIS: Primary | ICD-10-CM

## 2024-01-02 RX ORDER — BUDESONIDE 0.5 MG/2ML
INHALANT ORAL
Qty: 240 ML | Refills: 0 | Status: SHIPPED | OUTPATIENT
Start: 2024-01-02

## 2024-01-11 ENCOUNTER — TELEPHONE (OUTPATIENT)
Dept: RHEUMATOLOGY | Facility: CLINIC | Age: 54
End: 2024-01-11

## 2024-01-11 ENCOUNTER — TELEPHONE (OUTPATIENT)
Dept: GASTROENTEROLOGY | Facility: CLINIC | Age: 54
End: 2024-01-11

## 2024-01-11 NOTE — TELEPHONE ENCOUNTER
----- Message from Roxanna Ocasio LPN sent at 1/10/2024 11:18 AM EST -----  I called and spoke to patient. He states he is doing ok. He has some issues swallowing and constantly feels like he needs to clear his throat.  He did not start the budesonide yet. He will start it. Per Ines HUGHES he should mix the ampulles with 10 packets of splenda not one packet and drink twice daily. He verbalized understanding.  Please advise if any further recommendations.    Scheduling - please call patient to schedule egd with dilatation with Dr Sanchez. Thank you     ----- Message -----  From: Francisco J Sanchez MD  Sent: 1/4/2024  10:57 AM EST  To: [facility] [id number] [facility]    Please call the patient how  he is doing with EOE.  If need be can seen in the office.

## 2024-01-11 NOTE — TELEPHONE ENCOUNTER
I lmom for pt to please call back to schedule an EGD with Dr Sanchez.   Will call pt again if do not hear back from him.

## 2024-01-18 NOTE — TELEPHONE ENCOUNTER
Lmom for pt to please call back to schedule an EGD w/ Dr Sanchez.  Will call pt again if do not hear back from him.

## 2024-01-18 NOTE — TELEPHONE ENCOUNTER
Scheduled date of EGD(as of today): 03/14/2024    Physician performing EGD: Laura    Location of EGD: [facility]    Instructions reviewed with patient by: AL    Clearances: None

## 2024-01-25 DIAGNOSIS — K20.0 EOSINOPHILIC ESOPHAGITIS: ICD-10-CM

## 2024-01-25 RX ORDER — BUDESONIDE 0.5 MG/2ML
INHALANT ORAL
Qty: 720 ML | Refills: 1 | Status: SHIPPED | OUTPATIENT
Start: 2024-01-25

## 2024-01-30 ENCOUNTER — TELEMEDICINE (OUTPATIENT)
Dept: RHEUMATOLOGY | Facility: CLINIC | Age: 54
End: 2024-01-30
Payer: COMMERCIAL

## 2024-01-30 DIAGNOSIS — L40.50 PSORIATIC ARTHRITIS (HCC): Primary | ICD-10-CM

## 2024-01-30 DIAGNOSIS — Z79.60 LONG-TERM USE OF IMMUNOSUPPRESSANT MEDICATION: ICD-10-CM

## 2024-01-30 DIAGNOSIS — L40.9 PSORIASIS: ICD-10-CM

## 2024-01-30 PROCEDURE — 99214 OFFICE O/P EST MOD 30 MIN: CPT | Performed by: INTERNAL MEDICINE

## 2024-01-30 NOTE — PROGRESS NOTES
Virtual Regular Visit    Verification of patient location:    Patient is located at Home in the following state in which I hold an active license [place]      Assessment/Plan:    Problem List Items Addressed This Visit    None  Visit Diagnoses       Psoriatic arthritis (HCC)    -  Primary    Psoriasis        Long-term use of immunosuppressant medication                     Reason for visit is new patient.      Chief Complaint   Patient presents with    Virtual Regular Visit          Encounter provider Aida Peters MD    Provider located at [facility] [place]  [address]  [place] [address]      Recent Visits  No visits were found meeting these conditions.  Showing recent visits within past 7 days and meeting all other requirements  Today's Visits  Date Type Provider Dept   01/30/24 Telemedicine Aida Peters MD  [facility]   Showing today's visits and meeting all other requirements  Future Appointments  No visits were found meeting these conditions.  Showing future appointments within next 150 days and meeting all other requirements       The patient was identified by name and date of birth. Parviz Patel was informed that this is a telemedicine visit and that the visit is being conducted through the Epic Embedded platform. He agrees to proceed..  My office door was closed. No one else was in the room.  He acknowledged consent and understanding of privacy and security of the video platform. The patient has agreed to participate and understands they can discontinue the visit at any time.    Patient is aware this is a billable service.       Subjective    HPI     Mr. Patel is a 53-year-old male with history significant for psoriatic arthritis and psoriasis diagnosed in 2008 who presents for a second opinion.  He is currently on Skyrizi 150 mg every 3 months.  He is established with Dr. Libia Berg.    Patient reports a few years  prior to 2008 he was on a cruise over [facility] and returned with a sunburn.  Once this resolved he started to notice a small scaly patch on his scalp which was later diagnosed as psoriasis.  There were similar small patches scattered on his back/shoulder area as well.  1 to 2 years later he started to notice achiness of various joints and finally established with rheumatology for an evaluation.  He was seen by Dr. Dumont and diagnosed with psoriatic arthritis and psoriasis.  Initially he opted to pursue natural/holistic remedies which he did for 1 to 2 years which helped to some degree.  As his symptoms were then progressively worsening in regards to the joint pains he was started on Arthrotec which significantly helped him and he was on this for about 1 year.  Upon discussing with his rheumatologist the concerns for damage to his joints he was then agreeable to starting subcutaneous adalimumab 40 mg every 2 weeks which he was on up until a year ago.  This significantly helped with both the joint pains and psoriasis but about a year ago it started to lose efficacy.  After Dr. Dumont retired he established with Dr. Libia Berg in 2020 and given the decline in adalimumab last year he was started on risankizumab 150 mg every 3 months.  He reports this has helped with the psoriasis and psoriatic arthritis but is not as efficacious as the adalimumab.  He will still get some psoriasis on his scalp, tip of his nose, nostril region and behind his left ear as well as notice flareups of pain with mild swelling affecting his right knee as well as a sensation of body achiness.  No other joint swelling that he notices and there is no prolonged morning stiffness of his joints.  He is currently not taking any as needed pain medications.    He denies a history of inflammatory eye disease, inflammatory bowel disease or a family history of autoimmune disease.      Past Medical History:   Diagnosis Date    Arthritis     psoriatic     Colon polyp     Dysphagia     hx of dilatation    Eosinophilic esophagitis     GERD (gastroesophageal reflux disease)        Past Surgical History:   Procedure Laterality Date    COLONOSCOPY      HERNIA REPAIR      right inguinal    TONSILLECTOMY      UPPER GASTROINTESTINAL ENDOSCOPY      WISDOM TOOTH EXTRACTION         Current Outpatient Medications   Medication Sig Dispense Refill    budesonide (PULMICORT) 0.5 mg/2 mL nebulizer solution MIX 2 AMPULES OR 4ML FOR A TOTAL OF 1MG OF BUDESONIDE WITH ONE PACKET OF SPLENDA AND DRINK TWICE DAILY, RINSE MOUTH AFTER USE. 720 mL 1    Flovent  MCG/ACT inhaler 2 SPRAYS INTO THE MOUTH AND SWALLOW TWICE DAILY, DO NOT INHALE. RINSE MOUTH AFTER USE. 12 g 1    fluticasone (FLONASE) 50 mcg/act nasal spray SPRAY 2 SPRAYS INTO EACH NOSTRIL EVERY DAY      Multiple Vitamin (MULTIVITAMIN ADULT PO) Take by mouth in the morning      Omega-3 Fatty Acids (OMEGA-3 FISH OIL PO) Take by mouth in the morning      pantoprazole (PROTONIX) 40 mg tablet Take 1 tablet (40 mg total) by mouth daily before breakfast Take 30 minutes before 90 tablet 3    Risankizumab-rzaa (SKYRIZI, 150 MG DOSE, SC) Inject 150 mg under the skin      VITAMIN D, CHOLECALCIFEROL, PO Take by mouth daily       No current facility-administered medications for this visit.        No Known Allergies      Review of Systems  Constitutional: Negative for weight change, fevers, chills, night sweats, fatigue.  ENT/Mouth: Negative for hearing changes, ear pain, nasal congestion, sinus pain, hoarseness, sore throat, rhinorrhea, swallowing difficulty.   Eyes: Negative for pain, redness, discharge, vision changes.   Cardiovascular: Negative for chest pain, SOB, palpitations.   Respiratory: Negative for cough, sputum, wheezing, dyspnea.   Gastrointestinal: Negative for nausea, vomiting, diarrhea, constipation, pain, heartburn.  Genitourinary: Negative for dysuria, urinary frequency, hematuria.   Musculoskeletal: As per HPI.  Skin:  Negative for color changes.  Positive for skin rash.  Neuro: Negative for weakness, numbness, tingling, loss of consciousness.   Psych: Negative for anxiety, depression.   Heme/Lymph: Negative for easy bruising, bleeding, lymphadenopathy.        Video Exam    There were no vitals filed for this visit.    Physical Exam   General: Well appearing, well nourished, in no distress. Oriented x 3, normal mood and affect.  Skin: Good turgor, no rash in visualized regions, unusual bruising or prominent lesions.  HEENT:  Head: Normocephalic, atraumatic.  Eyes: Conjunctiva clear, sclera non-icteric, EOM intact.  Nose: No external lesions.  Neck: Supple.  Neurologic: Alert and oriented. No focal neurological deficits appreciated.   Psychiatric: Normal mood and affect.       Assessment and plan:  Mr. Patel is a 53-year-old male with history significant for psoriatic arthritis and psoriasis diagnosed in 2008 who presents for a second opinion.  He is currently on Skyrizi 150 mg every 3 months.  He is established with Dr. Libia Berg.    Joanne Guido presents today for a second opinion in regards to management of psoriatic arthritis and psoriasis that is currently managed with risankizumab 150 mg every 3 months.  He was initially diagnosed in approximately 2008 and shortly after this was started on subcutaneous adalimumab 40 mg every 2 weeks for a duration of 10 years but reports a gradual decline in efficacy which led to switching to risankizumab last year.  He reports while this medication has helped with both the inflammatory arthritis and psoriasis he still has a mild degree of symptoms and is not appreciating the same efficacy as he received while on adalimumab.  We had an extensive discussion on the overall management of psoriatic arthritis and psoriasis, the inflammatory pathways targeted and different medication options available for use.  We also reviewed if the risankizumab has been overall effective for him and he is only  appreciating mild symptoms of the psoriasis and psoriatic arthritis this may be acceptable to continue and as needed anti-inflammatory/topical steroids can be considered for mild flares.  He would like to review all the options we discussed today and will contact me if he is interested in establishing with [facility] rheumatology or considering alternate medications.      Visit Time  Total Visit Duration: 40 minutes.

## 2024-02-22 DIAGNOSIS — K20.0 EOSINOPHILIC ESOPHAGITIS: ICD-10-CM

## 2024-02-22 DIAGNOSIS — K21.00 GASTROESOPHAGEAL REFLUX DISEASE WITH ESOPHAGITIS WITHOUT HEMORRHAGE: ICD-10-CM

## 2024-02-22 RX ORDER — PANTOPRAZOLE SODIUM 40 MG/1
40 TABLET, DELAYED RELEASE ORAL
Qty: 90 TABLET | Refills: 1 | Status: SHIPPED | OUTPATIENT
Start: 2024-02-22

## 2024-02-29 ENCOUNTER — ANESTHESIA (OUTPATIENT)
Dept: ANESTHESIOLOGY | Facility: AMBULATORY SURGERY CENTER | Age: 54
End: 2024-02-29

## 2024-02-29 ENCOUNTER — ANESTHESIA EVENT (OUTPATIENT)
Dept: ANESTHESIOLOGY | Facility: AMBULATORY SURGERY CENTER | Age: 54
End: 2024-02-29

## 2024-03-26 ENCOUNTER — TELEPHONE (OUTPATIENT)
Age: 54
End: 2024-03-26

## 2024-03-26 NOTE — TELEPHONE ENCOUNTER
He can be offered an in-person office visit this Thursday at the [facility].

## 2024-03-26 NOTE — TELEPHONE ENCOUNTER
Patient called in to schedule a f/u appt with Dr. Peters. She does not have any in person visits available until October. Patient is not sure if he should be seen sooner or not because he did just start taking Otezla on March 1st. I offered sooner appts but they are virtual but patient thinks he should be seen in the office this time because his last visit was virtual. Patient would like a c/b with advice for his next f/u. Please advise.

## 2024-03-27 ENCOUNTER — NURSE TRIAGE (OUTPATIENT)
Age: 54
End: 2024-03-27

## 2024-03-27 DIAGNOSIS — K20.0 EOSINOPHILIC ESOPHAGITIS: Primary | ICD-10-CM

## 2024-03-27 RX ORDER — FLUTICASONE PROPIONATE 220 UG/1
AEROSOL, METERED RESPIRATORY (INHALATION)
Qty: 12 G | Refills: 1 | Status: SHIPPED | OUTPATIENT
Start: 2024-03-27

## 2024-03-27 NOTE — TELEPHONE ENCOUNTER
"Last OV 1/19/23 [facility] [place] for EOE  EGD 6/2023    Please see 12/31/23 Refill and 1/11/24 Telephone encounters regarding Flovent inhaler change to Budesonide. Flovent discontinued by .    Pt called to request alternative to Budesonide ampules for EOE. He is speciffically requesting an inhaler.     Additionally, pt states he had to cancel EGD as he is only available in the morning. States he is due for colonoscopy in July and prefers to complete EGD at same time.     Pt requesting callback with medication recommendations at [phone].    Reason for Disposition   Caller has NON-URGENT medicine question about med that PCP or specialist prescribed and triager unable to answer question    Answer Assessment - Initial Assessment Questions  1. NAME of MEDICATION: \"What medicine are you calling about?\"      Flovent Inhaler  2. QUESTION: \"What is your question?\" (e.g., medication refill, side effect)      Requesting alternative inhaler vs Budesonide ampules.    Protocols used: Medication Question Call-ADULT-[place]    "

## 2024-04-02 ENCOUNTER — TELEPHONE (OUTPATIENT)
Age: 54
End: 2024-04-02

## 2024-04-02 NOTE — TELEPHONE ENCOUNTER
Patient called the RX Refill Line. Message is being forwarded to the office.     Patient was calling to check to see if Fluticasone was called into the pharmacy. Been over a week and has not heard anything from the pharmacy. Has been out for over a week and patient frustrated.    Please contact patient at [phone] to let him know it was sent in

## 2024-04-03 DIAGNOSIS — K20.0 EOSINOPHILIC ESOPHAGITIS: ICD-10-CM

## 2024-04-03 RX ORDER — FLUTICASONE PROPIONATE 220 UG/1
AEROSOL, METERED RESPIRATORY (INHALATION)
Qty: 12 G | Refills: 1 | Status: SHIPPED | OUTPATIENT
Start: 2024-04-03

## 2024-04-03 NOTE — TELEPHONE ENCOUNTER
I called and spoke to pharmacy. They never received the script for fluticasone. Please resend to pharmacy. Thank you

## 2024-04-03 NOTE — TELEPHONE ENCOUNTER
I called patient and left message per consent advising him the medication was sent in on 3/27/24 and that I would call the pharmacy to confirm and call him back to update him. I called the pharmacy and they are not open yet. I will try again.

## 2024-04-03 NOTE — TELEPHONE ENCOUNTER
I called patient and left message per consent advising script was sent to pharmacy and to let us know if there is any issue and I also apologized for the delay.

## 2024-05-13 ENCOUNTER — TELEPHONE (OUTPATIENT)
Dept: GASTROENTEROLOGY | Facility: CLINIC | Age: 54
End: 2024-05-13

## 2024-05-13 NOTE — TELEPHONE ENCOUNTER
Pt is due for a colonoscopy - hx of adenomatous polyps / EGD - Eosinophilic Esophagitis (EOE). Dysphagia with Dr Sanchez. I lmom for pt to please call back to schedule. Will call again if do not hear back from him.

## 2024-05-17 ENCOUNTER — PREP FOR PROCEDURE (OUTPATIENT)
Dept: GASTROENTEROLOGY | Facility: CLINIC | Age: 54
End: 2024-05-17

## 2024-05-17 DIAGNOSIS — Z86.010 HISTORY OF ADENOMATOUS POLYP OF COLON: Primary | ICD-10-CM

## 2024-05-17 NOTE — TELEPHONE ENCOUNTER
05/17/24  Screened by: Ree Patino MA    Referring Provider recall egd/ colon    Pre- Screening:     There is no height or weight on file to calculate BMI.  Has patient been referred for a routine screening Colonoscopy? yes  Is the patient between 45-75 years old? yes      Previous Colonoscopy yes   If yes:    Date:     Facility:     Reason:       SCHEDULING STAFF: If the patient is between 45yrs-49yrs, please advise patient to confirm benefits/coverage with their insurance company for a routine screening colonoscopy, some insurance carriers will only cover at 50yrs or older. If the patient is over 75years old, please schedule an office visit.     Does the patient want to see a Gastroenterologist prior to their procedure OR are they having any GI symptoms? no    Has the patient been hospitalized or had abdominal surgery in the past 6 months? no    Does the patient use supplemental oxygen? no    Does the patient take Coumadin, Lovenox, Plavix, Elliquis, Xarelto, or other blood thinning medication? no    Has the patient had a stroke, cardiac event, or stent placed in the past year? no    SCHEDULING STAFF: If patient answers NO to above questions, then schedule procedure. If patient answers YES to above questions, then schedule office appointment.     If patient is between 45yrs - 49yrs, please advise patient that we will have to confirm benefits & coverage with their insurance company for a routine screening colonoscopy.

## 2024-05-17 NOTE — TELEPHONE ENCOUNTER
Scheduled date of EGD/colonoscopy (as of today): 07/31/24  Physician performing EGD/colonoscopy: NIKITA  Location of EGD/colonoscopy: [facility]  Desired bowel prep reviewed with patient: M/D  Instructions reviewed with patient by:  Clearances:  TRACY

## 2024-05-31 DIAGNOSIS — K20.0 EOSINOPHILIC ESOPHAGITIS: ICD-10-CM

## 2024-05-31 RX ORDER — FLUTICASONE PROPIONATE 220 UG/1
AEROSOL, METERED RESPIRATORY (INHALATION)
Qty: 12 G | Refills: 0 | Status: SHIPPED | OUTPATIENT
Start: 2024-05-31 | End: 2024-06-04

## 2024-06-04 DIAGNOSIS — K20.0 EOSINOPHILIC ESOPHAGITIS: ICD-10-CM

## 2024-06-04 DIAGNOSIS — K21.00 GASTROESOPHAGEAL REFLUX DISEASE WITH ESOPHAGITIS WITHOUT HEMORRHAGE: ICD-10-CM

## 2024-06-04 RX ORDER — PANTOPRAZOLE SODIUM 40 MG/1
40 TABLET, DELAYED RELEASE ORAL 2 TIMES DAILY
Qty: 100 TABLET | Refills: 1 | Status: SHIPPED | OUTPATIENT
Start: 2024-06-04

## 2024-06-04 RX ORDER — BUDESONIDE 1 MG/2ML
INHALANT ORAL
Qty: 120 ML | Refills: 3 | Status: SHIPPED | OUTPATIENT
Start: 2024-06-04

## 2024-06-07 DIAGNOSIS — K20.0 EOSINOPHILIC ESOPHAGITIS: Primary | ICD-10-CM

## 2024-06-07 RX ORDER — FLUTICASONE PROPIONATE 50 MCG
2 SPRAY, SUSPENSION (ML) NASAL 2 TIMES DAILY
Qty: 9.9 ML | Refills: 5 | Status: SHIPPED | OUTPATIENT
Start: 2024-06-07 | End: 2024-06-18

## 2024-06-18 DIAGNOSIS — K20.0 EOSINOPHILIC ESOPHAGITIS: Primary | ICD-10-CM

## 2024-06-18 RX ORDER — DEXAMETHASONE 4 MG/1
2 TABLET ORAL 2 TIMES DAILY
Qty: 12 G | Refills: 5 | Status: SHIPPED | OUTPATIENT
Start: 2024-06-18 | End: 2024-06-19

## 2024-06-19 DIAGNOSIS — K20.0 EOSINOPHILIC ESOPHAGITIS: ICD-10-CM

## 2024-06-19 RX ORDER — FLUTICASONE PROPIONATE 220 UG/1
AEROSOL, METERED RESPIRATORY (INHALATION)
Qty: 12 G | Refills: 1 | Status: SHIPPED | OUTPATIENT
Start: 2024-06-19

## 2024-07-31 ENCOUNTER — HOSPITAL ENCOUNTER (OUTPATIENT)
Dept: GASTROENTEROLOGY | Facility: HOSPITAL | Age: 54
Setting detail: OUTPATIENT SURGERY
Discharge: HOME | End: 2024-07-31
Attending: INTERNAL MEDICINE
Payer: COMMERCIAL

## 2024-07-31 ENCOUNTER — ANESTHESIA (OUTPATIENT)
Dept: GASTROENTEROLOGY | Facility: HOSPITAL | Age: 54
End: 2024-07-31

## 2024-07-31 ENCOUNTER — ANESTHESIA EVENT (OUTPATIENT)
Dept: GASTROENTEROLOGY | Facility: HOSPITAL | Age: 54
End: 2024-07-31

## 2024-07-31 VITALS
HEIGHT: 72 IN | RESPIRATION RATE: 16 BRPM | TEMPERATURE: 97.2 F | SYSTOLIC BLOOD PRESSURE: 108 MMHG | BODY MASS INDEX: 31.49 KG/M2 | HEART RATE: 58 BPM | DIASTOLIC BLOOD PRESSURE: 62 MMHG | WEIGHT: 232.5 LBS | OXYGEN SATURATION: 98 %

## 2024-07-31 DIAGNOSIS — Z86.010 HISTORY OF ADENOMATOUS POLYP OF COLON: ICD-10-CM

## 2024-07-31 DIAGNOSIS — K21.00 GASTROESOPHAGEAL REFLUX DISEASE WITH ESOPHAGITIS, UNSPECIFIED WHETHER HEMORRHAGE: ICD-10-CM

## 2024-07-31 DIAGNOSIS — K20.0 EOSINOPHILIC ESOPHAGITIS: ICD-10-CM

## 2024-07-31 PROCEDURE — 43239 EGD BIOPSY SINGLE/MULTIPLE: CPT | Performed by: INTERNAL MEDICINE

## 2024-07-31 PROCEDURE — 43251 EGD REMOVE LESION SNARE: CPT | Performed by: INTERNAL MEDICINE

## 2024-07-31 PROCEDURE — 45385 COLONOSCOPY W/LESION REMOVAL: CPT | Performed by: INTERNAL MEDICINE

## 2024-07-31 PROCEDURE — 88305 TISSUE EXAM BY PATHOLOGIST: CPT | Performed by: PATHOLOGY

## 2024-07-31 PROCEDURE — C1726 CATH, BAL DIL, NON-VASCULAR: HCPCS | Performed by: INTERNAL MEDICINE

## 2024-07-31 PROCEDURE — 43249 ESOPH EGD DILATION <30 MM: CPT | Performed by: INTERNAL MEDICINE

## 2024-07-31 RX ORDER — PROPOFOL 10 MG/ML
INJECTION, EMULSION INTRAVENOUS AS NEEDED
Status: DISCONTINUED | OUTPATIENT
Start: 2024-07-31 | End: 2024-07-31

## 2024-07-31 RX ORDER — SIMETHICONE 40MG/0.6ML
SUSPENSION, DROPS(FINAL DOSAGE FORM)(ML) ORAL AS NEEDED
Status: COMPLETED | OUTPATIENT
Start: 2024-07-31 | End: 2024-07-31

## 2024-07-31 RX ORDER — FENTANYL CITRATE 50 UG/ML
INJECTION, SOLUTION INTRAMUSCULAR; INTRAVENOUS AS NEEDED
Status: DISCONTINUED | OUTPATIENT
Start: 2024-07-31 | End: 2024-07-31

## 2024-07-31 RX ORDER — LIDOCAINE HYDROCHLORIDE 10 MG/ML
INJECTION, SOLUTION EPIDURAL; INFILTRATION; INTRACAUDAL; PERINEURAL AS NEEDED
Status: DISCONTINUED | OUTPATIENT
Start: 2024-07-31 | End: 2024-07-31

## 2024-07-31 RX ORDER — SODIUM CHLORIDE, SODIUM LACTATE, POTASSIUM CHLORIDE, CALCIUM CHLORIDE 600; 310; 30; 20 MG/100ML; MG/100ML; MG/100ML; MG/100ML
INJECTION, SOLUTION INTRAVENOUS CONTINUOUS PRN
Status: DISCONTINUED | OUTPATIENT
Start: 2024-07-31 | End: 2024-07-31

## 2024-07-31 RX ORDER — PHENYLEPHRINE HCL IN 0.9% NACL 1 MG/10 ML
SYRINGE (ML) INTRAVENOUS AS NEEDED
Status: DISCONTINUED | OUTPATIENT
Start: 2024-07-31 | End: 2024-07-31

## 2024-07-31 RX ADMIN — PROPOFOL 50 MG: 10 INJECTION, EMULSION INTRAVENOUS at 09:09

## 2024-07-31 RX ADMIN — PROPOFOL 25 MG: 10 INJECTION, EMULSION INTRAVENOUS at 09:04

## 2024-07-31 RX ADMIN — PROPOFOL 25 MG: 10 INJECTION, EMULSION INTRAVENOUS at 08:57

## 2024-07-31 RX ADMIN — LIDOCAINE HYDROCHLORIDE 100 MG: 10 INJECTION, SOLUTION EPIDURAL; INFILTRATION; INTRACAUDAL at 08:45

## 2024-07-31 RX ADMIN — Medication 40 MG: at 09:11

## 2024-07-31 RX ADMIN — PROPOFOL 25 MG: 10 INJECTION, EMULSION INTRAVENOUS at 09:15

## 2024-07-31 RX ADMIN — PROPOFOL 25 MG: 10 INJECTION, EMULSION INTRAVENOUS at 09:12

## 2024-07-31 RX ADMIN — PROPOFOL 100 MG: 10 INJECTION, EMULSION INTRAVENOUS at 08:50

## 2024-07-31 RX ADMIN — PROPOFOL 25 MG: 10 INJECTION, EMULSION INTRAVENOUS at 08:52

## 2024-07-31 RX ADMIN — SODIUM CHLORIDE, SODIUM LACTATE, POTASSIUM CHLORIDE, AND CALCIUM CHLORIDE: .6; .31; .03; .02 INJECTION, SOLUTION INTRAVENOUS at 09:31

## 2024-07-31 RX ADMIN — SODIUM CHLORIDE, SODIUM LACTATE, POTASSIUM CHLORIDE, AND CALCIUM CHLORIDE: .6; .31; .03; .02 INJECTION, SOLUTION INTRAVENOUS at 08:45

## 2024-07-31 RX ADMIN — PROPOFOL 25 MG: 10 INJECTION, EMULSION INTRAVENOUS at 08:55

## 2024-07-31 RX ADMIN — FENTANYL CITRATE 100 MCG: 50 INJECTION, SOLUTION INTRAMUSCULAR; INTRAVENOUS at 08:45

## 2024-07-31 RX ADMIN — PROPOFOL 50 MG: 10 INJECTION, EMULSION INTRAVENOUS at 08:51

## 2024-07-31 RX ADMIN — Medication 100 MCG: at 09:12

## 2024-07-31 RX ADMIN — Medication 100 MCG: at 09:03

## 2024-07-31 RX ADMIN — PROPOFOL 50 MG: 10 INJECTION, EMULSION INTRAVENOUS at 09:07

## 2024-07-31 RX ADMIN — PROPOFOL 25 MG: 10 INJECTION, EMULSION INTRAVENOUS at 08:53

## 2024-07-31 RX ADMIN — PROPOFOL 25 MG: 10 INJECTION, EMULSION INTRAVENOUS at 09:00

## 2024-07-31 RX ADMIN — PROPOFOL 25 MG: 10 INJECTION, EMULSION INTRAVENOUS at 08:54

## 2024-07-31 NOTE — H&P
History and Physical - [facility]  Parviz Patel 54 y.o. male MRN: [id number]                  HPI: Parviz Patel is a 54 y.o. year old male who presents for history of EOE polyps dysphagia      REVIEW OF SYSTEMS: Per the HPI, and otherwise unremarkable.    Historical Information   Past Medical History:   Diagnosis Date    Arthritis     psoriatic    Colon polyp     Dysphagia     hx of dilatation    Eosinophilic esophagitis     GERD (gastroesophageal reflux disease)      Past Surgical History:   Procedure Laterality Date    COLONOSCOPY      HERNIA REPAIR      right inguinal    TONSILLECTOMY      UPPER GASTROINTESTINAL ENDOSCOPY      WISDOM TOOTH EXTRACTION       Social History   Social History     Substance and Sexual Activity   Alcohol Use Not Currently     Social History     Substance and Sexual Activity   Drug Use Never     Social History     Tobacco Use   Smoking Status Never   Smokeless Tobacco Never     History reviewed. No pertinent family history.    Meds/Allergies       Current Outpatient Medications:     fluticasone (Flovent HFA) 220 mcg/act inhaler    mometasone (NASONEX) 50 mcg/act nasal spray    Otezla 30 MG TABS    pantoprazole (PROTONIX) 40 mg tablet    Multiple Vitamin (MULTIVITAMIN ADULT PO)    Omega-3 Fatty Acids (OMEGA-3 FISH OIL PO)    Risankizumab-rzaa (SKYRIZI, 150 MG DOSE, SC)    testosterone (ANDROGEL) 1.62 % TD gel pump    VITAMIN D, CHOLECALCIFEROL, PO    No Known Allergies    Objective     /77   Pulse 64   Temp 97.5 °F (36.4 °C) (Temporal)   Resp 18   Ht 6' (1.829 m)   Wt 105 kg (232 lb 8 oz)   SpO2 94%   BMI 31.53 kg/m²       PHYSICAL EXAM    Gen: NAD  Head: NCAT  CV: RRR  CHEST: Clear  ABD: soft, NT/ND  EXT: no edema      ASSESSMENT/PLAN:  This is a 54 y.o. year old male here for EGD colonoscopy, and he is stable and optimized for his procedure.

## 2024-07-31 NOTE — ANESTHESIA POSTPROCEDURE EVALUATION
Post-Op Assessment Note    CV Status:  Stable    Pain management: adequate       Mental Status:  Alert and awake   Hydration Status:  Euvolemic   PONV Controlled:  Controlled   Airway Patency:  Patent     Post Op Vitals Reviewed: Yes    No anethesia notable event occurred.                BP      Temp      Pulse     Resp      SpO2

## 2024-07-31 NOTE — ANESTHESIA PREPROCEDURE EVALUATION
Procedure:  EGD  COLONOSCOPY    Relevant Problems   ANESTHESIA (within normal limits)      CARDIO   (-) Angina at rest   (-) Angina of effort   (-) Chest pain   (-) PERES (dyspnea on exertion)      ENDO   (-) Diabetes mellitus type 1 (HCC)   (-) Diabetes mellitus, type 2 (HCC)      GI/HEPATIC   (+) Dysphagia   (+) GERD (gastroesophageal reflux disease)   (-) Chronic liver disease      /RENAL   (-) Chronic kidney disease      MUSCULOSKELETAL   (+) Arthritis      NEURO/PSYCH   (-) CVA (cerebral vascular accident) (HCC)   (-) Seizures (HCC)      PULMONARY   (-) Asthma   (-) Chronic obstructive pulmonary disease (HCC)   (-) Sleep apnea        Physical Exam    Airway    Mallampati score: I  TM Distance: >3 FB  Neck ROM: full     Dental   No notable dental hx     Cardiovascular      Pulmonary      Other Findings        Anesthesia Plan  ASA Score- 2     Anesthesia Type- IV sedation with anesthesia with ASA Monitors.         Additional Monitors:     Airway Plan:            Plan Factors-Exercise tolerance (METS): >4 METS.    Chart reviewed.                      Induction- intravenous.    Postoperative Plan-         Informed Consent- Anesthetic plan and risks discussed with patient.  I personally reviewed this patient with the CRNA. Discussed and agreed on the Anesthesia Plan with the CRNA..

## 2024-08-01 PROCEDURE — 88305 TISSUE EXAM BY PATHOLOGIST: CPT | Performed by: PATHOLOGY

## 2024-08-02 NOTE — RESULT ENCOUNTER NOTE
Patient had adenoma polyp removed during colonoscopy.  This is considered precancerous polyp.  Patient should have a follow-up colonoscopy in 5 years.  EGD biopsies consistent with eosinophilic esophagitis/EOE, continue management as discussed after the procedure.  If any difficulty swallowing call our office for emergent evaluation, if any GI symptoms call our office for evaluation accordingly.

## 2024-09-13 DIAGNOSIS — K21.00 GASTROESOPHAGEAL REFLUX DISEASE WITH ESOPHAGITIS WITHOUT HEMORRHAGE: ICD-10-CM

## 2024-09-13 DIAGNOSIS — K20.0 EOSINOPHILIC ESOPHAGITIS: ICD-10-CM

## 2024-09-13 RX ORDER — PANTOPRAZOLE SODIUM 40 MG/1
40 TABLET, DELAYED RELEASE ORAL 2 TIMES DAILY
Qty: 180 TABLET | Refills: 1 | Status: SHIPPED | OUTPATIENT
Start: 2024-09-13

## 2024-10-28 ENCOUNTER — TELEPHONE (OUTPATIENT)
Age: 54
End: 2024-10-28

## 2024-10-28 NOTE — TELEPHONE ENCOUNTER
Patient is calling to scheduling a follow-up with Dr. Peters. I offered two times on 10/30 in [place] and one on 10/31 at [place], but patient would first like to know if his appointment can be virtual or if Dr. Peters would prefer to see him in person. Patient would like a return call with an answer.

## 2024-10-29 ENCOUNTER — TELEPHONE (OUTPATIENT)
Age: 54
End: 2024-10-29

## 2024-11-19 DIAGNOSIS — K20.0 EOSINOPHILIC ESOPHAGITIS: ICD-10-CM

## 2024-11-19 RX ORDER — FLUTICASONE PROPIONATE 220 UG/1
AEROSOL, METERED RESPIRATORY (INHALATION)
Qty: 12 G | Refills: 6 | Status: SHIPPED | OUTPATIENT
Start: 2024-11-19

## 2025-01-21 PROBLEM — L40.50 PSORIATIC ARTHRITIS (HCC): Status: ACTIVE | Noted: 2025-01-21

## 2025-01-21 PROBLEM — L40.9 PSORIASIS: Status: ACTIVE | Noted: 2025-01-21

## 2025-01-21 PROBLEM — Z79.61 LONG-TERM CURRENT USE OF APREMILAST: Status: ACTIVE | Noted: 2025-01-21

## 2025-01-21 RX ORDER — APREMILAST 30 MG/1
30 TABLET, FILM COATED ORAL 2 TIMES DAILY
Qty: 180 TABLET | Refills: 1 | OUTPATIENT
Start: 2025-01-21

## 2025-01-21 NOTE — TELEPHONE ENCOUNTER
Reason for call:   [x] Refill   [] Prior Auth  [] Other:     Office:   [] PCP/Provider -   [x] Specialty/Provider - Rheum Fermin     Medication:     Otezla 30 MG  1 tablet 2 times daily          Pharmacy: [facility]     Does the patient have enough for 3 days?   [] Yes   [x] No - Send as HP to POD

## 2025-01-21 NOTE — TELEPHONE ENCOUNTER
I have not prescribed him Otezla, he only saw me once for a second opinion. If he requires refills he will need to see me first for an appointment.

## 2025-01-21 NOTE — TELEPHONE ENCOUNTER
Patient called refill line with update and clarification as he received notification that med (otezla) was denied.    He has appt 6/4/25 (soonest available)  He is out of medication, last ordering provider retired.    If rheum will not provide, can PCP manage until he can get in to see rheum? [facility] - internal message cannot be sent. Please arrange or advise.    [facility]

## 2025-01-22 ENCOUNTER — OFFICE VISIT (OUTPATIENT)
Dept: RHEUMATOLOGY | Facility: CLINIC | Age: 55
End: 2025-01-22
Payer: COMMERCIAL

## 2025-01-22 VITALS
WEIGHT: 242.6 LBS | SYSTOLIC BLOOD PRESSURE: 142 MMHG | HEIGHT: 72 IN | TEMPERATURE: 97.5 F | DIASTOLIC BLOOD PRESSURE: 82 MMHG | OXYGEN SATURATION: 98 % | HEART RATE: 72 BPM | BODY MASS INDEX: 32.86 KG/M2

## 2025-01-22 DIAGNOSIS — Z79.61 LONG-TERM CURRENT USE OF APREMILAST: ICD-10-CM

## 2025-01-22 DIAGNOSIS — L40.50 PSORIATIC ARTHRITIS (HCC): Primary | ICD-10-CM

## 2025-01-22 DIAGNOSIS — L40.9 PSORIASIS: ICD-10-CM

## 2025-01-22 PROCEDURE — 99214 OFFICE O/P EST MOD 30 MIN: CPT | Performed by: INTERNAL MEDICINE

## 2025-01-22 RX ORDER — APREMILAST 30 MG/1
30 TABLET, FILM COATED ORAL 2 TIMES DAILY
Qty: 180 TABLET | Refills: 1 | Status: SHIPPED | OUTPATIENT
Start: 2025-01-22

## 2025-01-22 NOTE — PROGRESS NOTES
Name: Parviz Patel      : 1970      MRN: [id number]  Encounter Provider: Aida Peters MD  Encounter Date: 2025   Encounter department: [facility] RHEUMATOLOGY [facility]  :  Assessment & Plan  Psoriatic arthritis (HCC)  Mr. Patel is a 54-year-old male with history significant for psoriatic arthritis and psoriasis diagnosed in  who presents for transfer of care from Dr. Libia Berg.  He is currently on apremilast 30 mg twice daily that was started in 2024.     - Guido presents today for a follow-up of psoriatic arthritis and psoriasis for which he is currently on apremilast 30 mg twice daily that was started in .  He is transferring care after Dr. Berg's residential.  He reports overall he has been stable on this medication without any significant symptoms related to the psoriatic arthritis or psoriasis.  For infrequent flareups he experiences I requested he can contact the office for a prescription of Arthrotec to take as needed since this has benefited him well in the past.  He is due to update high risk medication lab monitoring to assess for drug toxicities.    - Of note he was diagnosed with these conditions in approximately  and shortly after this was started on subcutaneous adalimumab 40 mg every 2 weeks for a duration of 10 years but had a gradual decline in efficacy which led to switching to risankizumab in  which was discontinued due to inefficacy with both the psoriatic arthritis and psoriasis.    Orders:    Otezla 30 MG TABS; Take 1 tablet by mouth 2 (two) times a day    C-reactive protein; Future    Sedimentation rate, automated; Future    Psoriasis    Orders:    Otezla 30 MG TABS; Take 1 tablet by mouth 2 (two) times a day    Long-term current use of apremilast    Orders:    Basic metabolic panel; Future      Patient's rheumatologic disease(s) threaten long-term function if not appropriately managed.      History of Present Illness   HPI    INITIAL VISIT NOTE  (1/2024):  Mr. Patel is a 53-year-old male with history significant for psoriatic arthritis and psoriasis diagnosed in 2008 who presents for a second opinion.  He is currently on Skyrizi 150 mg every 3 months.  He is established with Dr. Libia Berg.     Patient reports a few years prior to 2008 he was on a cruise over [facility] and returned with a sunburn.  Once this resolved he started to notice a small scaly patch on his scalp which was later diagnosed as psoriasis.  There were similar small patches scattered on his back/shoulder area as well.  1 to 2 years later he started to notice achiness of various joints and finally established with rheumatology for an evaluation.  He was seen by Dr. Dumont and diagnosed with psoriatic arthritis and psoriasis.  Initially he opted to pursue natural/holistic remedies which he did for 1 to 2 years which helped to some degree.  As his symptoms were then progressively worsening in regards to the joint pains he was started on Arthrotec which significantly helped him and he was on this for about 1 year.  Upon discussing with his rheumatologist the concerns for damage to his joints he was then agreeable to starting subcutaneous adalimumab 40 mg every 2 weeks which he was on up until a year ago.  This significantly helped with both the joint pains and psoriasis but about a year ago it started to lose efficacy.  After Dr. Dumont retired he established with Dr. Libia Berg in 2020 and given the decline in adalimumab last year he was started on risankizumab 150 mg every 3 months.  He reports this has helped with the psoriasis and psoriatic arthritis but is not as efficacious as the adalimumab.  He will still get some psoriasis on his scalp, tip of his nose, nostril region and behind his left ear as well as notice flareups of pain with mild swelling affecting his right knee as well as a sensation of body achiness.  No other joint swelling that he notices and there is no prolonged  morning stiffness of his joints.  He is currently not taking any as needed pain medications.     He denies a history of inflammatory eye disease, inflammatory bowel disease or a family history of autoimmune disease.      1/22/2025:  Patient presents for a follow-up of psoriatic arthritis and psoriasis.  He is currently on apremilast 30 mg twice daily that was started in 2/24.  He is transferring care from Dr. Libia Berg.    Patient reports with switching from Skyrizi to apremilast this has been more beneficial to him.  He is not reporting any daily joint pains, swelling or stiffness.  There has been no recurrence of psoriasis.  He does experience intermittent flareups infrequently which typically affects his mid back region, low back/hip area with stiffness and in his right knee.  The flareup is typically manageable but may last a few weeks before subsiding.  He does not take any over-the-counter pain medications for his symptoms but states Arthrotec in the past was very helpful to him.    He has been tolerating the apremilast well without any concerns for side effects or infections.          Review of Systems  Constitutional: Negative for weight change, fevers, chills, night sweats, fatigue.  ENT/Mouth: Negative for hearing changes, ear pain, nasal congestion, sinus pain, hoarseness, sore throat, rhinorrhea, swallowing difficulty.   Eyes: Negative for pain, redness, discharge, vision changes.   Cardiovascular: Negative for chest pain, SOB, palpitations.   Respiratory: Negative for cough, sputum, wheezing, dyspnea.   Gastrointestinal: Negative for nausea, vomiting, diarrhea, constipation, pain, heartburn.  Genitourinary: Negative for dysuria, urinary frequency, hematuria.   Musculoskeletal: As per HPI.  Skin: Negative for skin rash, color changes.   Neuro: Negative for weakness, numbness, tingling, loss of consciousness.   Psych: Negative for anxiety, depression.   Heme/Lymph: Negative for easy bruising, bleeding,  lymphadenopathy.      Past Medical History   Past Medical History:   Diagnosis Date    Arthritis     psoriatic    Colon polyp     Dysphagia     hx of dilatation    Eosinophilic esophagitis     GERD (gastroesophageal reflux disease)      Past Surgical History:   Procedure Laterality Date    COLONOSCOPY      HERNIA REPAIR      right inguinal    TONSILLECTOMY      UPPER GASTROINTESTINAL ENDOSCOPY      WISDOM TOOTH EXTRACTION       History reviewed. No pertinent family history.   reports that he has never smoked. He has never used smokeless tobacco. He reports that he does not currently use alcohol. He reports that he does not use drugs.  Current Outpatient Medications on File Prior to Visit   Medication Sig Dispense Refill    fluticasone (Flovent HFA) 220 mcg/act inhaler Spray 2 puffs into the mouth and swallow twice daily. Rinse mouth out after use. 12 g 6    mometasone (NASONEX) 50 mcg/act nasal spray 2 sprays into each nostril if needed      Multiple Vitamin (MULTIVITAMIN ADULT PO) Take by mouth in the morning      Omega-3 Fatty Acids (OMEGA-3 FISH OIL PO) Take by mouth in the morning      pantoprazole (PROTONIX) 40 mg tablet TAKE 1 TABLET (40 MG TOTAL) BY MOUTH 2 (TWO) TIMES A DAY TAKE 30 MINUTES BEFORE 180 tablet 1    testosterone (ANDROGEL) 1.62 % TD gel pump 2 PUMPS TO UPPER ARMS AND SHOULDERS DAILY      VITAMIN D, CHOLECALCIFEROL, PO Take by mouth daily      [DISCONTINUED] Otezla 30 MG TABS Take 30 mg by mouth 2 (two) times a day      [DISCONTINUED] Risankizumab-rzaa (SKYRIZI, 150 MG DOSE, SC) Inject 150 mg under the skin       No current facility-administered medications on file prior to visit.   No Known Allergies   Current Outpatient Medications on File Prior to Visit   Medication Sig Dispense Refill    fluticasone (Flovent HFA) 220 mcg/act inhaler Spray 2 puffs into the mouth and swallow twice daily. Rinse mouth out after use. 12 g 6    mometasone (NASONEX) 50 mcg/act nasal spray 2 sprays into each nostril  if needed      Multiple Vitamin (MULTIVITAMIN ADULT PO) Take by mouth in the morning      Omega-3 Fatty Acids (OMEGA-3 FISH OIL PO) Take by mouth in the morning      pantoprazole (PROTONIX) 40 mg tablet TAKE 1 TABLET (40 MG TOTAL) BY MOUTH 2 (TWO) TIMES A DAY TAKE 30 MINUTES BEFORE 180 tablet 1    testosterone (ANDROGEL) 1.62 % TD gel pump 2 PUMPS TO UPPER ARMS AND SHOULDERS DAILY      VITAMIN D, CHOLECALCIFEROL, PO Take by mouth daily      [DISCONTINUED] Otezla 30 MG TABS Take 30 mg by mouth 2 (two) times a day      [DISCONTINUED] Risankizumab-rzaa (SKYRIZI, 150 MG DOSE, SC) Inject 150 mg under the skin       No current facility-administered medications on file prior to visit.      Social History     Tobacco Use    Smoking status: Never    Smokeless tobacco: Never   Vaping Use    Vaping status: Never Used   Substance and Sexual Activity    Alcohol use: Not Currently    Drug use: Never    Sexual activity: Not on file        Objective   /82 (BP Location: Left arm, Patient Position: Sitting, Cuff Size: Adult)   Pulse 72   Temp 97.5 °F (36.4 °C) (Tympanic)   Ht 6' (1.829 m)   Wt 110 kg (242 lb 9.6 oz)   SpO2 98%   BMI 32.90 kg/m²      Physical Exam  General: Well appearing, well nourished, in no distress. Oriented x 3, normal mood and affect.  Ambulating without difficulty.  Skin: Good turgor, no rash, unusual bruising or prominent lesions.  No psoriasis visualized.  Hair: Normal texture and distribution.  Nails: Normal color, no deformities.  No pitting.   HEENT:  Head: Normocephalic, atraumatic.  Eyes: Conjunctiva clear, sclera non-icteric, EOM intact.  Nose: No external lesions.  Neck: Supple.  Extremities: No amputations or deformities, cyanosis, edema.  Musculoskeletal:   There is no peripheral joint soft tissue swelling or tenderness noted.  Neurologic: Alert and oriented. No focal neurological deficits appreciated.   Psychiatric: Normal mood and affect.

## 2025-01-22 NOTE — ASSESSMENT & PLAN NOTE
Mr. Patel is a 54-year-old male with history significant for psoriatic arthritis and psoriasis diagnosed in 2008 who presents for transfer of care from Dr. Libia Berg.  He is currently on apremilast 30 mg twice daily that was started in 2/2024.     - Guido presents today for a follow-up of psoriatic arthritis and psoriasis for which he is currently on apremilast 30 mg twice daily that was started in 2/24.  He is transferring care after Dr. Berg's [facility].  He reports overall he has been stable on this medication without any significant symptoms related to the psoriatic arthritis or psoriasis.  For infrequent flareups he experiences I requested he can contact the office for a prescription of Arthrotec to take as needed since this has benefited him well in the past.  He is due to update high risk medication lab monitoring to assess for drug toxicities.    - Of note he was diagnosed with these conditions in approximately 2008 and shortly after this was started on subcutaneous adalimumab 40 mg every 2 weeks for a duration of 10 years but had a gradual decline in efficacy which led to switching to risankizumab in 2023 which was discontinued due to inefficacy with both the psoriatic arthritis and psoriasis.    Orders:    Otezla 30 MG TABS; Take 1 tablet by mouth 2 (two) times a day    C-reactive protein; Future    Sedimentation rate, automated; Future

## 2025-03-08 DIAGNOSIS — K21.00 GASTROESOPHAGEAL REFLUX DISEASE WITH ESOPHAGITIS WITHOUT HEMORRHAGE: ICD-10-CM

## 2025-03-08 DIAGNOSIS — K20.0 EOSINOPHILIC ESOPHAGITIS: ICD-10-CM

## 2025-03-10 RX ORDER — PANTOPRAZOLE SODIUM 40 MG/1
TABLET, DELAYED RELEASE ORAL
Qty: 60 TABLET | Refills: 5 | Status: SHIPPED | OUTPATIENT
Start: 2025-03-10

## 2025-04-03 DIAGNOSIS — K20.0 EOSINOPHILIC ESOPHAGITIS: ICD-10-CM

## 2025-04-03 DIAGNOSIS — K21.00 GASTROESOPHAGEAL REFLUX DISEASE WITH ESOPHAGITIS WITHOUT HEMORRHAGE: ICD-10-CM

## 2025-04-03 RX ORDER — PANTOPRAZOLE SODIUM 40 MG/1
TABLET, DELAYED RELEASE ORAL
Qty: 180 TABLET | Refills: 2 | Status: SHIPPED | OUTPATIENT
Start: 2025-04-03

## 2025-05-29 ENCOUNTER — APPOINTMENT (OUTPATIENT)
Dept: LAB | Facility: CLINIC | Age: 55
End: 2025-05-29
Payer: COMMERCIAL

## 2025-05-29 LAB
ANION GAP SERPL CALCULATED.3IONS-SCNC: 10 MMOL/L (ref 4–13)
BUN SERPL-MCNC: 12 MG/DL (ref 5–25)
CALCIUM SERPL-MCNC: 10.1 MG/DL (ref 8.4–10.2)
CHLORIDE SERPL-SCNC: 101 MMOL/L (ref 96–108)
CO2 SERPL-SCNC: 28 MMOL/L (ref 21–32)
CREAT SERPL-MCNC: 1.28 MG/DL (ref 0.6–1.3)
CRP SERPL QL: 1.2 MG/L
ERYTHROCYTE [SEDIMENTATION RATE] IN BLOOD: 17 MM/HOUR (ref 0–19)
GFR SERPL CREATININE-BSD FRML MDRD: 62 ML/MIN/1.73SQ M
GLUCOSE P FAST SERPL-MCNC: 102 MG/DL (ref 65–99)
POTASSIUM SERPL-SCNC: 4.8 MMOL/L (ref 3.5–5.3)
SODIUM SERPL-SCNC: 139 MMOL/L (ref 135–147)

## 2025-06-04 ENCOUNTER — OFFICE VISIT (OUTPATIENT)
Dept: RHEUMATOLOGY | Facility: CLINIC | Age: 55
End: 2025-06-04
Payer: COMMERCIAL

## 2025-06-04 VITALS
WEIGHT: 236.4 LBS | HEART RATE: 79 BPM | DIASTOLIC BLOOD PRESSURE: 88 MMHG | BODY MASS INDEX: 32.02 KG/M2 | OXYGEN SATURATION: 98 % | HEIGHT: 72 IN | SYSTOLIC BLOOD PRESSURE: 136 MMHG

## 2025-06-04 DIAGNOSIS — L40.50 PSORIATIC ARTHRITIS (HCC): Primary | ICD-10-CM

## 2025-06-04 DIAGNOSIS — Z79.61 LONG-TERM CURRENT USE OF APREMILAST: ICD-10-CM

## 2025-06-04 DIAGNOSIS — L40.9 PSORIASIS: ICD-10-CM

## 2025-06-04 PROCEDURE — 99214 OFFICE O/P EST MOD 30 MIN: CPT | Performed by: INTERNAL MEDICINE

## 2025-06-04 RX ORDER — APREMILAST 30 MG/1
30 TABLET, FILM COATED ORAL 2 TIMES DAILY
Qty: 180 TABLET | Refills: 1 | Status: SHIPPED | OUTPATIENT
Start: 2025-06-04

## 2025-06-04 RX ORDER — ADALIMUMAB 40MG/0.8ML
KIT SUBCUTANEOUS
COMMUNITY
End: 2025-06-04

## 2025-06-04 RX ORDER — DEXTROMETHORPHAN HYDROBROMIDE AND PROMETHAZINE HYDROCHLORIDE 15; 6.25 MG/5ML; MG/5ML
5 SYRUP ORAL 4 TIMES DAILY PRN
COMMUNITY
Start: 2025-04-26

## 2025-06-04 RX ORDER — LIFITEGRAST 50 MG/ML
1 SOLUTION/ DROPS OPHTHALMIC 2 TIMES DAILY
COMMUNITY
Start: 2025-05-30

## 2025-06-04 RX ORDER — ALFUZOSIN HYDROCHLORIDE 10 MG/1
TABLET, EXTENDED RELEASE ORAL
COMMUNITY

## 2025-06-04 RX ORDER — PREDNISONE 20 MG/1
2 TABLET ORAL DAILY
COMMUNITY
Start: 2025-04-26 | End: 2025-06-04

## 2025-06-04 NOTE — PROGRESS NOTES
Name: Parviz Patel      : 1970      MRN: [id number]  Encounter Provider: Aida Peters MD  Encounter Date: 2025   Encounter department: [facility] [place]  :  Assessment & Plan  Psoriatic arthritis (HCC)  Patient has history of psoriatic arthritis, currently on Otezla 30 mg twice daily.  Has been doing well on the medications overall without major complaints of joint stiffness or swelling. Denies any dactylitis, enthesitis or uveitis at this time. No recent flares.  Plan  Continue Otezla 30 mg twice daily  RTC 6 months  Orders:    Otezla 30 MG TABS; Take 1 tablet by mouth 2 (two) times a day    Psoriasis    Orders:    Otezla 30 MG TABS; Take 1 tablet by mouth 2 (two) times a day    Long-term current use of apremilast             History of Present Illness     INITIAL VISIT NOTE (2024):  Mr. Patel is a 53-year-old male with history significant for psoriatic arthritis and psoriasis diagnosed in  who presents for a second opinion.  He is currently on Skyrizi 150 mg every 3 months.  He is established with Dr. Libia Berg.     Patient reports a few years prior to  he was on a cruise over [facility] and returned with a sunburn.  Once this resolved he started to notice a small scaly patch on his scalp which was later diagnosed as psoriasis.  There were similar small patches scattered on his back/shoulder area as well.  1 to 2 years later he started to notice achiness of various joints and finally established with rheumatology for an evaluation.  He was seen by Dr. Dumont and diagnosed with psoriatic arthritis and psoriasis.  Initially he opted to pursue natural/holistic remedies which he did for 1 to 2 years which helped to some degree.  As his symptoms were then progressively worsening in regards to the joint pains he was started on Arthrotec which significantly helped him and he was on this for about 1 year.  Upon discussing with his rheumatologist the concerns for  damage to his joints he was then agreeable to starting subcutaneous adalimumab 40 mg every 2 weeks which he was on up until a year ago.  This significantly helped with both the joint pains and psoriasis but about a year ago it started to lose efficacy.  After Dr. Dumont retired he established with Dr. Libia Berg in 2020 and given the decline in adalimumab last year he was started on risankizumab 150 mg every 3 months.  He reports this has helped with the psoriasis and psoriatic arthritis but is not as efficacious as the adalimumab.  He will still get some psoriasis on his scalp, tip of his nose, nostril region and behind his left ear as well as notice flareups of pain with mild swelling affecting his right knee as well as a sensation of body achiness.  No other joint swelling that he notices and there is no prolonged morning stiffness of his joints.  He is currently not taking any as needed pain medications.     He denies a history of inflammatory eye disease, inflammatory bowel disease or a family history of autoimmune disease.        1/22/2025:  Patient presents for a follow-up of psoriatic arthritis and psoriasis.  He is currently on apremilast 30 mg twice daily that was started in 2/24.  He is transferring care from Dr. Libia Berg.     Patient reports with switching from Skyrizi to apremilast this has been more beneficial to him.  He is not reporting any daily joint pains, swelling or stiffness.  There has been no recurrence of psoriasis.  He does experience intermittent flareups infrequently which typically affects his mid back region, low back/hip area with stiffness and in his right knee.  The flareup is typically manageable but may last a few weeks before subsiding.  He does not take any over-the-counter pain medications for his symptoms but states Arthrotec in the past was very helpful to him.     He has been tolerating the apremilast well without any concerns for side effects or  infections.    6/4/25  HPI  Parviz Patel is a 55 y.o. male who presents for follow-up of psoriasis and psoriatic arthritis.    Currently on Otezla 30 mg twice daily.    Recent labs from this month show normal ESR and CRP.    Patient used to have dactylitis but no longer having any swollen or stiff joints. Denies enthesitis or uveitis. No nail pitting.     Has plans to see eye specialist this year.          Review of Systems   Constitutional: Negative.    HENT: Negative.     Eyes: Negative.    Respiratory: Negative.     Cardiovascular: Negative.    Gastrointestinal: Negative.    Musculoskeletal: Negative.           Objective   /88   Pulse 79   Ht 6' (1.829 m)   Wt 107 kg (236 lb 6.4 oz)   SpO2 98%   BMI 32.06 kg/m²      Physical Exam  Constitutional:       Appearance: Normal appearance.   HENT:      Head: Normocephalic.     Eyes:      Extraocular Movements: Extraocular movements intact.      Pupils: Pupils are equal, round, and reactive to light.       Cardiovascular:      Pulses: Normal pulses.      Heart sounds: Normal heart sounds.   Pulmonary:      Effort: Pulmonary effort is normal.      Breath sounds: Normal breath sounds.     Musculoskeletal:      Comments: MSK Exam  The following areas have been examined today and do not show any signs of synovitis, tenderness, or restricted ROM unless otherwise specified below:  Neck  Shoulders  Back  Elbows  Wrists  Hands  Hips  Knees  Ankles  Feet      Neurological:      Mental Status: He is alert and oriented to person, place, and time.

## 2025-06-04 NOTE — ASSESSMENT & PLAN NOTE
Patient has history of psoriatic arthritis, currently on Otezla 30 mg twice daily.  Has been doing well on the medications overall without major complaints of joint stiffness or swelling. Denies any dactylitis, enthesitis or uveitis at this time. No recent flares.  Plan  Continue Otezla 30 mg twice daily  RTC 6 months  Orders:    Otezla 30 MG TABS; Take 1 tablet by mouth 2 (two) times a day

## 2025-07-03 DIAGNOSIS — K20.0 EOSINOPHILIC ESOPHAGITIS: ICD-10-CM

## 2025-07-03 RX ORDER — FLUTICASONE PROPIONATE 220 UG/1
AEROSOL, METERED RESPIRATORY (INHALATION)
Qty: 12 G | Refills: 6 | Status: SHIPPED | OUTPATIENT
Start: 2025-07-03

## 2025-08-13 ENCOUNTER — TELEPHONE (OUTPATIENT)
Age: 55
End: 2025-08-13

## 2025-08-18 DIAGNOSIS — K20.0 EOSINOPHILIC ESOPHAGITIS: ICD-10-CM

## 2025-08-18 RX ORDER — FLUTICASONE PROPIONATE 220 UG/1
AEROSOL, METERED RESPIRATORY (INHALATION)
Qty: 12 G | Refills: 6 | Status: SHIPPED | OUTPATIENT
Start: 2025-08-18